# Patient Record
Sex: MALE | Race: WHITE | NOT HISPANIC OR LATINO | Employment: OTHER | URBAN - METROPOLITAN AREA
[De-identification: names, ages, dates, MRNs, and addresses within clinical notes are randomized per-mention and may not be internally consistent; named-entity substitution may affect disease eponyms.]

---

## 2021-01-01 ENCOUNTER — TRANSFERRED RECORDS (OUTPATIENT)
Dept: MULTI SPECIALTY CLINIC | Facility: CLINIC | Age: 66
End: 2021-01-01

## 2021-09-18 ENCOUNTER — HOSPITAL ENCOUNTER (INPATIENT)
Facility: CLINIC | Age: 66
LOS: 3 days | Discharge: HOME | DRG: 039 | End: 2021-09-21
Attending: INTERNAL MEDICINE | Admitting: INTERNAL MEDICINE
Payer: COMMERCIAL

## 2021-09-18 ENCOUNTER — HOSPITAL ENCOUNTER (EMERGENCY)
Facility: CLINIC | Age: 66
Discharge: ANOTHER HOSPITAL | End: 2021-09-18
Attending: EMERGENCY MEDICINE | Admitting: EMERGENCY MEDICINE
Payer: COMMERCIAL

## 2021-09-18 ENCOUNTER — APPOINTMENT (OUTPATIENT)
Dept: CT IMAGING | Facility: CLINIC | Age: 66
End: 2021-09-18
Attending: EMERGENCY MEDICINE
Payer: COMMERCIAL

## 2021-09-18 ENCOUNTER — APPOINTMENT (OUTPATIENT)
Dept: MRI IMAGING | Facility: CLINIC | Age: 66
End: 2021-09-18
Attending: EMERGENCY MEDICINE
Payer: COMMERCIAL

## 2021-09-18 VITALS
BODY MASS INDEX: 25.84 KG/M2 | WEIGHT: 195 LBS | TEMPERATURE: 97.8 F | HEART RATE: 50 BPM | HEIGHT: 73 IN | RESPIRATION RATE: 16 BRPM | OXYGEN SATURATION: 98 % | SYSTOLIC BLOOD PRESSURE: 166 MMHG | DIASTOLIC BLOOD PRESSURE: 80 MMHG

## 2021-09-18 DIAGNOSIS — I65.22 STENOSIS OF LEFT CAROTID ARTERY: ICD-10-CM

## 2021-09-18 DIAGNOSIS — I63.9 ACUTE ISCHEMIC STROKE (H): Primary | ICD-10-CM

## 2021-09-18 LAB
ANION GAP SERPL CALCULATED.3IONS-SCNC: 7 MMOL/L (ref 5–18)
APTT PPP: 26 SECONDS (ref 22–38)
BASOPHILS # BLD AUTO: 0.1 10E3/UL (ref 0–0.2)
BASOPHILS NFR BLD AUTO: 1 %
BUN SERPL-MCNC: 23 MG/DL (ref 8–22)
CALCIUM SERPL-MCNC: 9 MG/DL (ref 8.5–10.5)
CHLORIDE BLD-SCNC: 106 MMOL/L (ref 98–107)
CO2 SERPL-SCNC: 25 MMOL/L (ref 22–31)
CREAT BLD-MCNC: 1.1 MG/DL (ref 0.7–1.3)
CREAT SERPL-MCNC: 1.1 MG/DL (ref 0.7–1.3)
EOSINOPHIL # BLD AUTO: 0.1 10E3/UL (ref 0–0.7)
EOSINOPHIL NFR BLD AUTO: 1 %
ERYTHROCYTE [DISTWIDTH] IN BLOOD BY AUTOMATED COUNT: 11.9 % (ref 10–15)
GFR SERPL CREATININE-BSD FRML MDRD: 69 ML/MIN/1.73M2
GFR SERPL CREATININE-BSD FRML MDRD: >60 ML/MIN/1.73M2
GLUCOSE BLD-MCNC: 116 MG/DL (ref 70–125)
GLUCOSE BLDC GLUCOMTR-MCNC: 121 MG/DL (ref 70–99)
GLUCOSE BLDC GLUCOMTR-MCNC: 143 MG/DL (ref 70–99)
HCT VFR BLD AUTO: 42 % (ref 40–53)
HGB BLD-MCNC: 14.4 G/DL (ref 13.3–17.7)
HOLD SPECIMEN: NORMAL
IMM GRANULOCYTES # BLD: 0 10E3/UL
IMM GRANULOCYTES NFR BLD: 0 %
INR PPP: 0.98 (ref 0.85–1.15)
LYMPHOCYTES # BLD AUTO: 1.9 10E3/UL (ref 0.8–5.3)
LYMPHOCYTES NFR BLD AUTO: 32 %
MCH RBC QN AUTO: 31.2 PG (ref 26.5–33)
MCHC RBC AUTO-ENTMCNC: 34.3 G/DL (ref 31.5–36.5)
MCV RBC AUTO: 91 FL (ref 78–100)
MONOCYTES # BLD AUTO: 0.6 10E3/UL (ref 0–1.3)
MONOCYTES NFR BLD AUTO: 10 %
NEUTROPHILS # BLD AUTO: 3.3 10E3/UL (ref 1.6–8.3)
NEUTROPHILS NFR BLD AUTO: 56 %
NRBC # BLD AUTO: 0 10E3/UL
NRBC BLD AUTO-RTO: 0 /100
PLATELET # BLD AUTO: 246 10E3/UL (ref 150–450)
POTASSIUM BLD-SCNC: 4.4 MMOL/L (ref 3.5–5)
RBC # BLD AUTO: 4.62 10E6/UL (ref 4.4–5.9)
SARS-COV-2 RNA RESP QL NAA+PROBE: NEGATIVE
SODIUM SERPL-SCNC: 138 MMOL/L (ref 136–145)
TROPONIN I SERPL-MCNC: <0.01 NG/ML (ref 0–0.29)
WBC # BLD AUTO: 5.9 10E3/UL (ref 4–11)

## 2021-09-18 PROCEDURE — 70498 CT ANGIOGRAPHY NECK: CPT

## 2021-09-18 PROCEDURE — 85730 THROMBOPLASTIN TIME PARTIAL: CPT | Performed by: EMERGENCY MEDICINE

## 2021-09-18 PROCEDURE — 93005 ELECTROCARDIOGRAM TRACING: CPT | Performed by: EMERGENCY MEDICINE

## 2021-09-18 PROCEDURE — 70551 MRI BRAIN STEM W/O DYE: CPT

## 2021-09-18 PROCEDURE — 250N000013 HC RX MED GY IP 250 OP 250 PS 637: Performed by: EMERGENCY MEDICINE

## 2021-09-18 PROCEDURE — 85048 AUTOMATED LEUKOCYTE COUNT: CPT | Performed by: EMERGENCY MEDICINE

## 2021-09-18 PROCEDURE — 258N000003 HC RX IP 258 OP 636: Performed by: INTERNAL MEDICINE

## 2021-09-18 PROCEDURE — 85610 PROTHROMBIN TIME: CPT | Performed by: EMERGENCY MEDICINE

## 2021-09-18 PROCEDURE — C9803 HOPD COVID-19 SPEC COLLECT: HCPCS

## 2021-09-18 PROCEDURE — 99291 CRITICAL CARE FIRST HOUR: CPT | Mod: 25

## 2021-09-18 PROCEDURE — 84484 ASSAY OF TROPONIN QUANT: CPT | Performed by: EMERGENCY MEDICINE

## 2021-09-18 PROCEDURE — 250N000013 HC RX MED GY IP 250 OP 250 PS 637: Performed by: INTERNAL MEDICINE

## 2021-09-18 PROCEDURE — 80048 BASIC METABOLIC PNL TOTAL CA: CPT | Performed by: EMERGENCY MEDICINE

## 2021-09-18 PROCEDURE — U0003 INFECTIOUS AGENT DETECTION BY NUCLEIC ACID (DNA OR RNA); SEVERE ACUTE RESPIRATORY SYNDROME CORONAVIRUS 2 (SARS-COV-2) (CORONAVIRUS DISEASE [COVID-19]), AMPLIFIED PROBE TECHNIQUE, MAKING USE OF HIGH THROUGHPUT TECHNOLOGIES AS DESCRIBED BY CMS-2020-01-R: HCPCS | Performed by: EMERGENCY MEDICINE

## 2021-09-18 PROCEDURE — 120N000001 HC R&B MED SURG/OB

## 2021-09-18 PROCEDURE — 250N000011 HC RX IP 250 OP 636: Performed by: EMERGENCY MEDICINE

## 2021-09-18 PROCEDURE — 82565 ASSAY OF CREATININE: CPT

## 2021-09-18 PROCEDURE — 36415 COLL VENOUS BLD VENIPUNCTURE: CPT | Performed by: EMERGENCY MEDICINE

## 2021-09-18 RX ORDER — PROCHLORPERAZINE 25 MG
12.5 SUPPOSITORY, RECTAL RECTAL EVERY 12 HOURS PRN
Status: DISCONTINUED | OUTPATIENT
Start: 2021-09-18 | End: 2021-09-19

## 2021-09-18 RX ORDER — ASPIRIN 325 MG
325 TABLET ORAL ONCE
Status: COMPLETED | OUTPATIENT
Start: 2021-09-18 | End: 2021-09-18

## 2021-09-18 RX ORDER — LIDOCAINE 40 MG/G
CREAM TOPICAL
Status: DISCONTINUED | OUTPATIENT
Start: 2021-09-18 | End: 2021-09-20

## 2021-09-18 RX ORDER — ATORVASTATIN CALCIUM 40 MG/1
40 TABLET, FILM COATED ORAL EVERY EVENING
Status: DISCONTINUED | OUTPATIENT
Start: 2021-09-18 | End: 2021-09-21

## 2021-09-18 RX ORDER — ONDANSETRON 4 MG/1
4 TABLET, ORALLY DISINTEGRATING ORAL EVERY 6 HOURS PRN
Status: DISCONTINUED | OUTPATIENT
Start: 2021-09-18 | End: 2021-09-19

## 2021-09-18 RX ORDER — ASPIRIN 81 MG/1
81 TABLET ORAL DAILY
Status: DISCONTINUED | OUTPATIENT
Start: 2021-09-19 | End: 2021-09-21 | Stop reason: HOSPADM

## 2021-09-18 RX ORDER — CLOPIDOGREL BISULFATE 75 MG/1
300 TABLET ORAL ONCE
Status: COMPLETED | OUTPATIENT
Start: 2021-09-18 | End: 2021-09-18

## 2021-09-18 RX ORDER — IOPAMIDOL 755 MG/ML
100 INJECTION, SOLUTION INTRAVASCULAR ONCE
Status: COMPLETED | OUTPATIENT
Start: 2021-09-18 | End: 2021-09-18

## 2021-09-18 RX ORDER — ASPIRIN 81 MG/1
81 TABLET, CHEWABLE ORAL DAILY
Status: DISCONTINUED | OUTPATIENT
Start: 2021-09-19 | End: 2021-09-21 | Stop reason: HOSPADM

## 2021-09-18 RX ORDER — LABETALOL HYDROCHLORIDE 5 MG/ML
10-20 INJECTION, SOLUTION INTRAVENOUS EVERY 10 MIN PRN
Status: DISCONTINUED | OUTPATIENT
Start: 2021-09-18 | End: 2021-09-20

## 2021-09-18 RX ORDER — PROCHLORPERAZINE MALEATE 5 MG
5 TABLET ORAL EVERY 6 HOURS PRN
Status: DISCONTINUED | OUTPATIENT
Start: 2021-09-18 | End: 2021-09-19

## 2021-09-18 RX ORDER — HYDRALAZINE HYDROCHLORIDE 20 MG/ML
10-20 INJECTION INTRAMUSCULAR; INTRAVENOUS
Status: DISCONTINUED | OUTPATIENT
Start: 2021-09-18 | End: 2021-09-20

## 2021-09-18 RX ORDER — SODIUM CHLORIDE 9 MG/ML
INJECTION, SOLUTION INTRAVENOUS CONTINUOUS
Status: ACTIVE | OUTPATIENT
Start: 2021-09-18 | End: 2021-09-19

## 2021-09-18 RX ORDER — ONDANSETRON 2 MG/ML
4 INJECTION INTRAMUSCULAR; INTRAVENOUS EVERY 6 HOURS PRN
Status: DISCONTINUED | OUTPATIENT
Start: 2021-09-18 | End: 2021-09-19

## 2021-09-18 RX ORDER — IBUPROFEN 200 MG
200 TABLET ORAL EVERY 4 HOURS PRN
Status: ON HOLD | COMMUNITY
End: 2021-09-21

## 2021-09-18 RX ADMIN — ATORVASTATIN CALCIUM 40 MG: 40 TABLET, FILM COATED ORAL at 21:20

## 2021-09-18 RX ADMIN — CLOPIDOGREL BISULFATE 300 MG: 75 TABLET, FILM COATED ORAL at 14:59

## 2021-09-18 RX ADMIN — SODIUM CHLORIDE: 9 INJECTION, SOLUTION INTRAVENOUS at 21:22

## 2021-09-18 RX ADMIN — IOPAMIDOL 100 ML: 755 INJECTION, SOLUTION INTRAVENOUS at 12:41

## 2021-09-18 RX ADMIN — ASPIRIN 325 MG ORAL TABLET 325 MG: 325 PILL ORAL at 13:22

## 2021-09-18 ASSESSMENT — ENCOUNTER SYMPTOMS
DIFFICULTY URINATING: 0
COLOR CHANGE: 0
SHORTNESS OF BREATH: 0
CONFUSION: 0
HEADACHES: 0
ARTHRALGIAS: 0
EYE REDNESS: 0
FEVER: 0
SPEECH DIFFICULTY: 1
ABDOMINAL PAIN: 0
NECK STIFFNESS: 0

## 2021-09-18 ASSESSMENT — MIFFLIN-ST. JEOR: SCORE: 1713.39

## 2021-09-18 NOTE — PHARMACY-ADMISSION MEDICATION HISTORY
Pharmacy Note - Admission Medication History    Pertinent Provider Information: Pt stated he should start to be better about taking aspirin regularly     ______________________________________________________________________    Prior To Admission (PTA) med list completed and updated in EMR.       PTA Med List   Medication Sig Last Dose     Ascorbic Acid 500 MG CHEW Take 500 mg by mouth daily  9/13/2021     aspirin (ASA) 81 MG EC tablet Take 81 mg by mouth daily  Past Month at Unknown time     ibuprofen (ADVIL/MOTRIN) 200 MG tablet Take 200 mg by mouth every 4 hours as needed for mild pain 9/17/2021       Information source(s): Patient, Family member and CareEverywhere/SureScripts  Method of interview communication: in-person    Summary of Changes to PTA Med List  New: ibuprofen  Discontinued: black elderberry  Changed: none    Patient was asked about OTC/herbal products specifically.  PTA med list reflects this.    In the past week, patient estimated taking medication this percent of the time:  less than 50% due to other.    Allergies were reviewed, assessed, and updated with the patient.      Patient does not use any multi-dose medications prior to admission.    The information provided in this note is only as accurate as the sources available at the time of the update(s).    Thank you for the opportunity to participate in the care of this patient.    Shayna Johnson  9/18/2021 2:48 PM

## 2021-09-18 NOTE — ED PROVIDER NOTES
This is a patient that was seen by Dr. Whitley.  His work-up is done and he has had a TIA and has ICA stenosis.  Plan is to admit him to a neuro bed when we can find 1.  Bed was found at [facility] and I spoke to Dr. Stanley who accepted the patient.  I have updated the patient and he is symptom free and feeling well and in agreement     Eladio Garza MD  09/18/21 1524

## 2021-09-18 NOTE — ED PROVIDER NOTES
EMERGENCY DEPARTMENT ENCOUNTER     NAME: Bernabe Grullon   AGE: 66 year old male   YOB: 1955   MRN: [id number]   EVALUATION DATE & TIME: 9/18/2021 12:08 PM   PCP: No primary care provider on file.     Chief Complaint   Patient presents with     Aphasia   :    FINAL IMPRESSION       1. Stenosis of left carotid artery           ED COURSE & MEDICAL DECISION MAKING      12:08 PM I met with the patient, obtained history, performed an initial exam, and discussed options and plan for diagnostics and treatment here in the ED.   12:11 PM Spoke with neurologist, Nusrat Riggs, who recommends against TPA based on his NIH scale.   12:24 PM Spoke with radiologist, Dr. Sargent.  12:52 PM Spoke with radiologist, Dr. Sargent, concerning results from CT scan.   1:14 PM Spoke with neurologist, Nusrat Riggs. Updated patient regarding plan moving forward.     Pertinent Labs & Imaging studies reviewed. (See chart for details)   66 year old male  presents to the Emergency Department for evaluation of slurred speech and dysarthria that started 30 minutes prior to arrival. Initial Vitals Reviewed. Initial exam notable for generally well-appearing male who has no extremity symptoms or facial droop, but he does have some dysarthria and difficulty with word finding.  NIH stroke scale of 1.  Per his wife, he does have a history of known carotid gnosis on the left.  Stroke code was initiated and patient was taken for CT and CTA.  Fortunately the plain head CT does not show any bleeding but the CTA shows what radiology is telling me is a 95% narrowing of his left internal carotid which is likely what is making him symptomatic.  I initially discussed his case with neurology and given the low NIH stroke scale they recommended against TPA.  Ultimately I have an MRI ordered and I will discuss the case with neurology again for any further recommendations, but I do think that he will require admission to the  "hospital and neurology consultation.  Therefore, he will need to transfer for care.    Update: I have discussed the case again with neurologist.  It is likely that the significant carotid stenosis is what is causing the symptoms, and it is either causing TIA or CVA that did not show up on the CTA.  There is an MRI ordered to help delineate.  He will need neurology consultation and likely intervention of this carotid with a CEA, so temporarily I am giving him a Plavix load and full dose aspirin per neurology recommendations.  He will be held here in our emergency department until neuro telemetry bed becomes available at another facility with neurology capabilities.           At the conclusion of the encounter I discussed the results of all of the tests and the disposition. The questions were answered. The patient or family acknowledged understanding and was agreeable with the care plan.     60 minutes critical care time, see procedure note below for details if relevant    MEDICATIONS GIVEN IN THE EMERGENCY:   Medications - No data to display   NEW PRESCRIPTIONS STARTED AT TODAY'S ER VISIT   New Prescriptions    No medications on file     ================================================================   HISTORY OF PRESENT ILLNESS       Patient information was obtained from: Patient   Use of Intrepreter: N/A    Bernabe Grullon is a 66 year old male with no pertinent medical history who presents via walk in for evaluation of slurred speech.     Patient reports sudden onset of slurred speech at 11:30AM today. He was reportedly making lunch when symptoms began. Wife of patient reports he was unable to say the word \"picture\" and other words. He states he felt healthy this morning before symptoms. Of note, patient does have a blockage on one side, for which he saw a specialist and they do yearly checks on him. Patient denies blood thinners, diabetes, hypertension, or history of brain bleeds. No other complaints at this " time.     ================================================================    REVIEW OF SYSTEMS       Review of Systems   Constitutional: Negative for fever.   HENT: Negative for congestion.    Eyes: Negative for redness.   Respiratory: Negative for shortness of breath.    Cardiovascular: Negative for chest pain.   Gastrointestinal: Negative for abdominal pain.   Genitourinary: Negative for difficulty urinating.   Musculoskeletal: Negative for arthralgias and neck stiffness.   Skin: Negative for color change.   Neurological: Positive for speech difficulty (slurred). Negative for headaches.   Psychiatric/Behavioral: Negative for confusion.   All other systems reviewed and are negative.      PAST HISTORY     PAST MEDICAL HISTORY:   No past medical history on file.   Carotid stenosis   PAST SURGICAL HISTORY:   No past surgical history on file.   CURRENT MEDICATIONS:   No current outpatient medications on file.    ALLERGIES:   Not on File   FAMILY HISTORY:   No family history on file.   SOCIAL HISTORY:   Social History     Socioeconomic History     Marital status: Not on file     Spouse name: Not on file     Number of children: Not on file     Years of education: Not on file     Highest education level: Not on file   Occupational History     Not on file   Tobacco Use     Smoking status: Not on file   Substance and Sexual Activity     Alcohol use: Not on file     Drug use: Not on file     Sexual activity: Not on file   Other Topics Concern     Not on file   Social History Narrative     Not on file     Social Determinants of Health     Financial Resource Strain:      Difficulty of Paying Living Expenses:    Food Insecurity:      Worried About Running Out of Food in the Last Year:      Ran Out of Food in the Last Year:    Transportation Needs:      Lack of Transportation (Medical):      Lack of Transportation (Non-Medical):    Physical Activity:      Days of Exercise per Week:      Minutes of Exercise per Session:   "  Stress:      Feeling of Stress :    Social Connections:      Frequency of Communication with Friends and Family:      Frequency of Social Gatherings with Friends and Family:      Attends Restorationism Services:      Active Member of Clubs or Organizations:      Attends Club or Organization Meetings:      Marital Status:    Intimate Partner Violence:      Fear of Current or Ex-Partner:      Emotionally Abused:      Physically Abused:      Sexually Abused:         VITALS  No data found.     ================================================================    PHYSICAL EXAM     VITAL SIGNS: BP (!) 162/102   Pulse 53   Temp 97.8  F (36.6  C) (Oral)   Resp 16   Ht 1.854 m (6' 1\")   Wt 88.5 kg (195 lb)   SpO2 97%   BMI 25.73 kg/m     Constitutional:  Awake, no acute distress   HENT:  Atraumatic, oropharynx without exudate or erythema, membranes moist  Lymph:  No adenopathy  Eyes: EOM intact, PERRL, no injection  Neck: Supple  Respiratory:  Clear to auscultation bilaterally, no wheezes or crackles   Cardiovascular:  Regular rate and rhythm, single S1 and S2   GI:  Soft, nontender, nondistended, no rebound or guarding   Musculoskeletal:  Moves all extremities, no lower extremity edema, no deformities    Skin:  Warm, dry  Neurologic:  Alert and oriented x3, no focal deficits noted     National Institutes of Health Stroke Scale  Exam Interval: Baseline   Score    Level of consciousness: (0)   Alert, keenly responsive    LOC questions: (0)   Answers both questions correctly    LOC commands: (0)   Performs both tasks correctly    Best gaze: (0)   Normal    Visual: (0)   No visual loss    Facial palsy: (0)   Normal symmetrical movements    Motor arm (left): (0)   No drift    Motor arm (right): (0)   No drift    Motor leg (left): (0)   No drift    Motor leg (right): (0)   No drift    Limb ataxia: (0)   Absent    Sensory: (0)   Normal- no sensory loss    Best language: (0)   Normal- no aphasia    Dysarthria: (1)   Mild to " moderate dysarthria    Extinction and inattention: (0)   No abnormality        Total Score:  1         ================================================================  LAB       All pertinent labs reviewed and interpreted.   Labs Ordered and Resulted from Time of ED Arrival Up to the Time of Departure from the ED   BASIC METABOLIC PANEL - Abnormal; Notable for the following components:       Result Value    Urea Nitrogen 23 (*)     All other components within normal limits   GLUCOSE BY METER - Abnormal; Notable for the following components:    GLUCOSE BY METER POCT 121 (*)     All other components within normal limits   INR - Normal   PARTIAL THROMBOPLASTIN TIME - Normal   TROPONIN I - Normal   ISTAT CREATININE POCT - Normal   GLUCOSE MONITOR NURSING POCT   CBC WITH PLATELETS AND DIFFERENTIAL   EXTRA RED TOP TUBE   INITIATE   NOTIFY   MEASURE WEIGHT   VITAL SIGNS   NEURO CHECKS   CARDIAC CONTINUOUS MONITORING   PULSE OXIMETRY NURSING   ACTIVITY   HEAD OF BED   PERIPHERAL IV CATHETER   IV ACCESS   IP DYSPHAGIA SCREEN   CALL   CBC WITH PLATELETS & DIFFERENTIAL    Narrative:     The following orders were created for panel order CBC with Platelets & Differential.  Procedure                               Abnormality         Status                     ---------                               -----------         ------                     CBC with platelets and d...[[id number]]                      Final result                 Please view results for these tests on the individual orders.   EXTRA TUBE    Narrative:     The following orders were created for panel order Extra Tube.  Procedure                               Abnormality         Status                     ---------                               -----------         ------                     Extra Red Top Tube[[id number]]                               Final result                 Please view results for these tests on the individual orders.         ===============================================================  RADIOLOGY       Reviewed all pertinent imaging. Please see official radiology report.   CTA Head Neck with Contrast   Final Result   IMPRESSION:    HEAD CT:   1.  No CT evidence for acute intracranial process.   2.  Brain atrophy and presumed chronic microvascular ischemic changes as above.      HEAD CTA:    1.  No significant stenosis, aneurysm, or high flow vascular malformation identified.   2.  Variant [place] of Valerio anatomy as above.      NECK CTA:   1.  There is luminal low-attenuation within the proximal left internal carotid artery just distal to its origin favored reflect mural and/or intravascular thrombus within severe high-grade narrowing (greater than 95%) or less likely segmental occlusion.    There is markedly diminished caliber of the left cervical ICA distal to this with improved vascular caliber of the cavernous and supraclinoid left ICA.      Noncontrast imaging findings were discussed Dr. Whitley of the clinical service at 12:23 PM. CTA imaging findings were discussed with Dr. Whitley of the clinical service at 12:50 PM on 09/18/2021.         MR Brain w/o Contrast    (Results Pending)         ================================================================  EKG     EKG reviewed interpreted by me shows sinus bradycardia with rate of 57, normal axis,  with no acute ST or T wave changes    I have independently reviewed and interpreted the EKG(s) documented above.     ================================================================  PROCEDURES       Critical Care  Performed by: Viridiana Whitley MD  Authorized by: Viridiana Whitley MD  Total critical care time: 60 minutes  Critical care time was exclusive of separately billable procedures and treating other patients.  Critical care was necessary to treat or prevent imminent or life-threatening deterioration of the following conditions: Stroke code  Critical care was time spent  personally by me on the following activities: development of treatment plan with patient or surrogate, discussions with consultants, examination of patient, evaluation of patient's response to treatment, obtaining history from patient or surrogate, ordering and performing treatments and interventions, ordering and review of laboratory studies, ordering and review of radiographic studies and re-evaluation of patient's condition, this excludes any separately billable procedures.        I, danie, am serving as a scribe to document services personally performed by Dr. Whitley based on my observation and the provider's statements to me. I, Viridiana Whitley MD attest that danie is acting in a scribe capacity, has observed my performance of the services and has documented them in accordance with my direction.   Viridiana Whitley M.D.   Emergency Medicine   [facility] EMERGENCY ROOM  [address] [place] [id number]  [phone]  Dept: [phone]          Viridiana Whitley MD  09/18/21 1555

## 2021-09-18 NOTE — ED TRIAGE NOTES
"Arrived by private car b/b spouse  Pt reports at about 1130 today noted slight headache, slurred speech and \"not able to get words out\"   Called provider to triage to evaluate, Tier 1 stroke code called  "

## 2021-09-19 ENCOUNTER — APPOINTMENT (OUTPATIENT)
Dept: OCCUPATIONAL THERAPY | Facility: CLINIC | Age: 66
DRG: 039 | End: 2021-09-19
Attending: INTERNAL MEDICINE
Payer: COMMERCIAL

## 2021-09-19 ENCOUNTER — APPOINTMENT (OUTPATIENT)
Dept: INTERVENTIONAL RADIOLOGY/VASCULAR | Facility: CLINIC | Age: 66
DRG: 039 | End: 2021-09-19
Attending: SURGERY
Payer: COMMERCIAL

## 2021-09-19 LAB
ANION GAP SERPL CALCULATED.3IONS-SCNC: 9 MMOL/L (ref 3–14)
ATRIAL RATE - MUSE: 57 BPM
BUN SERPL-MCNC: 15 MG/DL (ref 7–30)
CALCIUM SERPL-MCNC: 8.9 MG/DL (ref 8.5–10.1)
CHLORIDE BLD-SCNC: 109 MMOL/L (ref 94–109)
CHOLEST SERPL-MCNC: 231 MG/DL
CO2 SERPL-SCNC: 23 MMOL/L (ref 20–32)
CREAT SERPL-MCNC: 0.94 MG/DL (ref 0.66–1.25)
DIASTOLIC BLOOD PRESSURE - MUSE: NORMAL MMHG
ERYTHROCYTE [DISTWIDTH] IN BLOOD BY AUTOMATED COUNT: 11.9 % (ref 10–15)
GFR SERPL CREATININE-BSD FRML MDRD: 84 ML/MIN/1.73M2
GLUCOSE BLD-MCNC: 117 MG/DL (ref 70–99)
GLUCOSE BLDC GLUCOMTR-MCNC: 102 MG/DL (ref 70–99)
GLUCOSE BLDC GLUCOMTR-MCNC: 105 MG/DL (ref 70–99)
GLUCOSE BLDC GLUCOMTR-MCNC: 107 MG/DL (ref 70–99)
GLUCOSE BLDC GLUCOMTR-MCNC: 124 MG/DL (ref 70–99)
GLUCOSE BLDC GLUCOMTR-MCNC: 98 MG/DL (ref 70–99)
HBA1C MFR BLD: 5.3 % (ref 0–5.6)
HCT VFR BLD AUTO: 45.1 % (ref 40–53)
HDLC SERPL-MCNC: 54 MG/DL
HGB BLD-MCNC: 15.7 G/DL (ref 13.3–17.7)
INTERPRETATION ECG - MUSE: NORMAL
LDLC SERPL CALC-MCNC: 139 MG/DL
MCH RBC QN AUTO: 31.1 PG (ref 26.5–33)
MCHC RBC AUTO-ENTMCNC: 34.8 G/DL (ref 31.5–36.5)
MCV RBC AUTO: 89 FL (ref 78–100)
NONHDLC SERPL-MCNC: 177 MG/DL
P AXIS - MUSE: 81 DEGREES
PLATELET # BLD AUTO: 266 10E3/UL (ref 150–450)
POTASSIUM BLD-SCNC: 3.8 MMOL/L (ref 3.4–5.3)
PR INTERVAL - MUSE: 218 MS
QRS DURATION - MUSE: 60 MS
QT - MUSE: 416 MS
QTC - MUSE: 404 MS
R AXIS - MUSE: 14 DEGREES
RBC # BLD AUTO: 5.05 10E6/UL (ref 4.4–5.9)
SODIUM SERPL-SCNC: 141 MMOL/L (ref 133–144)
SYSTOLIC BLOOD PRESSURE - MUSE: NORMAL MMHG
T AXIS - MUSE: 41 DEGREES
TRIGL SERPL-MCNC: 188 MG/DL
VENTRICULAR RATE- MUSE: 57 BPM
WBC # BLD AUTO: 6.9 10E3/UL (ref 4–11)

## 2021-09-19 PROCEDURE — 36226 PLACE CATH VERTEBRAL ART: CPT | Mod: LT | Performed by: RADIOLOGY

## 2021-09-19 PROCEDURE — 99152 MOD SED SAME PHYS/QHP 5/>YRS: CPT

## 2021-09-19 PROCEDURE — 99222 1ST HOSP IP/OBS MODERATE 55: CPT | Mod: 57 | Performed by: SURGERY

## 2021-09-19 PROCEDURE — 250N000013 HC RX MED GY IP 250 OP 250 PS 637: Performed by: PHYSICIAN ASSISTANT

## 2021-09-19 PROCEDURE — 80061 LIPID PANEL: CPT | Performed by: INTERNAL MEDICINE

## 2021-09-19 PROCEDURE — 250N000011 HC RX IP 250 OP 636: Performed by: SURGERY

## 2021-09-19 PROCEDURE — 85027 COMPLETE CBC AUTOMATED: CPT | Performed by: INTERNAL MEDICINE

## 2021-09-19 PROCEDURE — 120N000001 HC R&B MED SURG/OB

## 2021-09-19 PROCEDURE — C1760 CLOSURE DEV, VASC: HCPCS

## 2021-09-19 PROCEDURE — 83036 HEMOGLOBIN GLYCOSYLATED A1C: CPT | Performed by: INTERNAL MEDICINE

## 2021-09-19 PROCEDURE — C1769 GUIDE WIRE: HCPCS

## 2021-09-19 PROCEDURE — 80048 BASIC METABOLIC PNL TOTAL CA: CPT | Performed by: INTERNAL MEDICINE

## 2021-09-19 PROCEDURE — B3171ZZ FLUOROSCOPY OF LEFT INTERNAL CAROTID ARTERY USING LOW OSMOLAR CONTRAST: ICD-10-PCS | Performed by: RADIOLOGY

## 2021-09-19 PROCEDURE — C1887 CATHETER, GUIDING: HCPCS

## 2021-09-19 PROCEDURE — 250N000013 HC RX MED GY IP 250 OP 250 PS 637: Performed by: INTERNAL MEDICINE

## 2021-09-19 PROCEDURE — 250N000009 HC RX 250

## 2021-09-19 PROCEDURE — 250N000011 HC RX IP 250 OP 636

## 2021-09-19 PROCEDURE — 99152 MOD SED SAME PHYS/QHP 5/>YRS: CPT | Mod: GC | Performed by: RADIOLOGY

## 2021-09-19 PROCEDURE — 36415 COLL VENOUS BLD VENIPUNCTURE: CPT | Performed by: INTERNAL MEDICINE

## 2021-09-19 PROCEDURE — 76937 US GUIDE VASCULAR ACCESS: CPT | Mod: 26 | Performed by: RADIOLOGY

## 2021-09-19 PROCEDURE — 99232 SBSQ HOSP IP/OBS MODERATE 35: CPT | Performed by: INTERNAL MEDICINE

## 2021-09-19 PROCEDURE — 258N000003 HC RX IP 258 OP 636: Performed by: INTERNAL MEDICINE

## 2021-09-19 PROCEDURE — 250N000013 HC RX MED GY IP 250 OP 250 PS 637: Performed by: NURSE PRACTITIONER

## 2021-09-19 PROCEDURE — 272N000567 HC SHEATH CR4

## 2021-09-19 PROCEDURE — 97165 OT EVAL LOW COMPLEX 30 MIN: CPT | Mod: GO

## 2021-09-19 PROCEDURE — 99222 1ST HOSP IP/OBS MODERATE 55: CPT | Mod: GC | Performed by: PSYCHIATRY & NEUROLOGY

## 2021-09-19 PROCEDURE — 255N000002 HC RX 255 OP 636: Performed by: RADIOLOGY

## 2021-09-19 RX ORDER — ONDANSETRON 2 MG/ML
4 INJECTION INTRAMUSCULAR; INTRAVENOUS EVERY 6 HOURS PRN
Status: DISCONTINUED | OUTPATIENT
Start: 2021-09-19 | End: 2021-09-20

## 2021-09-19 RX ORDER — SODIUM CHLORIDE 9 MG/ML
INJECTION, SOLUTION INTRAVENOUS CONTINUOUS
Status: DISCONTINUED | OUTPATIENT
Start: 2021-09-19 | End: 2021-09-19

## 2021-09-19 RX ORDER — SODIUM CHLORIDE 9 MG/ML
INJECTION, SOLUTION INTRAVENOUS CONTINUOUS
Status: ACTIVE | OUTPATIENT
Start: 2021-09-19 | End: 2021-09-20

## 2021-09-19 RX ORDER — NALOXONE HYDROCHLORIDE 0.4 MG/ML
0.2 INJECTION, SOLUTION INTRAMUSCULAR; INTRAVENOUS; SUBCUTANEOUS
Status: DISCONTINUED | OUTPATIENT
Start: 2021-09-19 | End: 2021-09-19 | Stop reason: HOSPADM

## 2021-09-19 RX ORDER — ACETAMINOPHEN 650 MG/1
650 SUPPOSITORY RECTAL EVERY 4 HOURS PRN
Status: DISCONTINUED | OUTPATIENT
Start: 2021-09-19 | End: 2021-09-21 | Stop reason: HOSPADM

## 2021-09-19 RX ORDER — LIDOCAINE 40 MG/G
CREAM TOPICAL
Status: CANCELLED | OUTPATIENT
Start: 2021-09-19

## 2021-09-19 RX ORDER — SODIUM CHLORIDE 9 MG/ML
INJECTION, SOLUTION INTRAVENOUS CONTINUOUS
Status: CANCELLED | OUTPATIENT
Start: 2021-09-19

## 2021-09-19 RX ORDER — PROCHLORPERAZINE MALEATE 5 MG
5 TABLET ORAL EVERY 6 HOURS PRN
Status: DISCONTINUED | OUTPATIENT
Start: 2021-09-19 | End: 2021-09-21 | Stop reason: HOSPADM

## 2021-09-19 RX ORDER — FENTANYL CITRATE 50 UG/ML
25-50 INJECTION, SOLUTION INTRAMUSCULAR; INTRAVENOUS EVERY 5 MIN PRN
Status: DISCONTINUED | OUTPATIENT
Start: 2021-09-19 | End: 2021-09-19 | Stop reason: HOSPADM

## 2021-09-19 RX ORDER — NALOXONE HYDROCHLORIDE 0.4 MG/ML
0.4 INJECTION, SOLUTION INTRAMUSCULAR; INTRAVENOUS; SUBCUTANEOUS
Status: DISCONTINUED | OUTPATIENT
Start: 2021-09-19 | End: 2021-09-19 | Stop reason: HOSPADM

## 2021-09-19 RX ORDER — ACETAMINOPHEN 325 MG/1
650 TABLET ORAL EVERY 4 HOURS PRN
Status: DISCONTINUED | OUTPATIENT
Start: 2021-09-19 | End: 2021-09-21 | Stop reason: HOSPADM

## 2021-09-19 RX ORDER — PROCHLORPERAZINE 25 MG
12.5 SUPPOSITORY, RECTAL RECTAL EVERY 12 HOURS PRN
Status: DISCONTINUED | OUTPATIENT
Start: 2021-09-19 | End: 2021-09-21 | Stop reason: HOSPADM

## 2021-09-19 RX ORDER — IOPAMIDOL 612 MG/ML
100 INJECTION, SOLUTION INTRAVASCULAR ONCE
Status: COMPLETED | OUTPATIENT
Start: 2021-09-19 | End: 2021-09-19

## 2021-09-19 RX ORDER — HEPARIN SODIUM 200 [USP'U]/100ML
1 INJECTION, SOLUTION INTRAVENOUS CONTINUOUS PRN
Status: DISCONTINUED | OUTPATIENT
Start: 2021-09-19 | End: 2021-09-19 | Stop reason: HOSPADM

## 2021-09-19 RX ORDER — CLOPIDOGREL BISULFATE 75 MG/1
75 TABLET ORAL DAILY
Status: DISCONTINUED | OUTPATIENT
Start: 2021-09-19 | End: 2021-09-21 | Stop reason: HOSPADM

## 2021-09-19 RX ORDER — FLUMAZENIL 0.1 MG/ML
0.2 INJECTION, SOLUTION INTRAVENOUS
Status: DISCONTINUED | OUTPATIENT
Start: 2021-09-19 | End: 2021-09-19 | Stop reason: HOSPADM

## 2021-09-19 RX ORDER — ONDANSETRON 4 MG/1
4 TABLET, ORALLY DISINTEGRATING ORAL EVERY 6 HOURS PRN
Status: DISCONTINUED | OUTPATIENT
Start: 2021-09-19 | End: 2021-09-20

## 2021-09-19 RX ADMIN — MIDAZOLAM HYDROCHLORIDE 1 MG: 1 INJECTION, SOLUTION INTRAMUSCULAR; INTRAVENOUS at 15:24

## 2021-09-19 RX ADMIN — ASPIRIN 81 MG CHEWABLE TABLET 81 MG: 81 TABLET CHEWABLE at 08:31

## 2021-09-19 RX ADMIN — SODIUM CHLORIDE: 9 INJECTION, SOLUTION INTRAVENOUS at 16:35

## 2021-09-19 RX ADMIN — ONDANSETRON 4 MG: 2 INJECTION INTRAMUSCULAR; INTRAVENOUS at 16:32

## 2021-09-19 RX ADMIN — LIDOCAINE HYDROCHLORIDE 7 ML: 10 INJECTION, SOLUTION INFILTRATION; PERINEURAL at 15:26

## 2021-09-19 RX ADMIN — FENTANYL CITRATE 50 MCG: 50 INJECTION, SOLUTION INTRAMUSCULAR; INTRAVENOUS at 15:24

## 2021-09-19 RX ADMIN — CLOPIDOGREL BISULFATE 75 MG: 75 TABLET ORAL at 13:13

## 2021-09-19 RX ADMIN — IOPAMIDOL 18 ML: 612 INJECTION, SOLUTION INTRAVENOUS at 15:38

## 2021-09-19 RX ADMIN — HEPARIN SODIUM 3 BAG: 200 INJECTION, SOLUTION INTRAVENOUS at 15:29

## 2021-09-19 RX ADMIN — Medication 1 MG: at 22:04

## 2021-09-19 RX ADMIN — ATORVASTATIN CALCIUM 40 MG: 40 TABLET, FILM COATED ORAL at 19:23

## 2021-09-19 RX ADMIN — ACETAMINOPHEN 650 MG: 325 TABLET, FILM COATED ORAL at 22:04

## 2021-09-19 RX ADMIN — ENOXAPARIN SODIUM 40 MG: 40 INJECTION SUBCUTANEOUS at 19:23

## 2021-09-19 RX ADMIN — ACETAMINOPHEN 650 MG: 325 TABLET, FILM COATED ORAL at 17:43

## 2021-09-19 ASSESSMENT — ACTIVITIES OF DAILY LIVING (ADL)
ADLS_ACUITY_SCORE: 4

## 2021-09-19 NOTE — CONSULTS
"      [facility]    Stroke Consult Note    Reason for Consult:  \"stroke\"    Chief Complaint: No chief complaint on file.       HPI  Bernabe Grullon is a 66 year old male with past medical history significant for known L ICA stenosis who presented to the [facility] ED 9/18 for evaluation of dysarthria and word finding difficulty that began around noon yesterday. NIHSS documented as 1 at the OSH. CTH was negative for acute pathology. CTA head/neck demonstrates >95% L ICA stenosis. A follow-up MRI shows a recent L parietal infarct. He was transferred to [facility] for further management and vascular surgery evaluation.     Today on exam he reports feeling back to baseline. He shares that he had vascular imaging in April that showed ~50% L ICA narrowing.     Stroke Evaluation Summarized    MRI/Head CT MRI ann marie with acute to early subacute left parietal infarcts   Intracranial Vasculature CTA head unremarkable   Cervical Vasculature CTA neck with >95% narrowing with possible segmental occlusion of the L ICA     Echocardiogram Pending**   EKG/Telemetry Sinus mai with 1st degree AVB   Other Testing Not Applicable     LDL  9/19/2021: 139 mg/dL   A1C  9/19/2021: 5.3 %   Troponin No lab value available in past 48 hrs       Impression  Acute ischemic stroke of left parietal lobe due to large-artery atherosclerosis - symptomatic L ICA stenosis vs possible segmental occlusion    Recommendations  - Recommend DAPT with ASA 81 mg + Plavix 75 mg (Loaded with Plavix 9/18). Per vascular note, continue on ASA/Plavix  - , recommend increasing Lipitor to 80 mg daily with goal LDL 40-70  - Permissive HTN today, strict BP control post-CEA.   - Per vascular, plan for DSA to ensure that L ICA is not entirely occluded. Tentative CEA Tuesday  - Awaiting TTE  - Therapies as needed    Patient Follow-up    - final recommendation pending work-up    Thank you for this consult. We will continue to follow.     Syl Thomas, " "APRN, CNP  Neurology  To page me or covering stroke neurology team member, click here: AMCOM   Choose \"On Call\" tab at top, then search dropdown box for \"Neurology Adult\", select location, press Enter, then look for stroke/neuro ICU/telestroke.    _____________________________________________________    Clinically Significant Risk Factors Present on Admission             # Platelet Defect: home medication list includes an antiplatelet medication       Past Medical History   Past Medical History:   Diagnosis Date     Carotid stenosis      Past Surgical History   History reviewed. No pertinent surgical history.  Medications   Home Meds  Prior to Admission medications    Medication Sig Start Date End Date Taking? Authorizing Provider   Ascorbic Acid 500 MG CHEW Take 500 mg by mouth daily    Yes Reported, Patient   aspirin (ASA) 81 MG EC tablet Take 81 mg by mouth daily  3/2/21  Yes Reported, Patient   ibuprofen (ADVIL/MOTRIN) 200 MG tablet Take 200 mg by mouth every 4 hours as needed for mild pain   Yes Unknown, Entered By History       Scheduled Meds    aspirin  81 mg Oral Daily    Or     aspirin  81 mg Oral or NG Tube Daily     atorvastatin  40 mg Oral or Feeding Tube QPM     sodium chloride (PF)  3 mL Intracatheter Q8H       Infusion Meds    - MEDICATION INSTRUCTIONS -       - MEDICATION INSTRUCTIONS -       sodium chloride         PRN Meds  labetalol **OR** hydrALAZINE, lidocaine 4%, lidocaine (buffered or not buffered), - MEDICATION INSTRUCTIONS -, - MEDICATION INSTRUCTIONS -, ondansetron **OR** ondansetron, prochlorperazine **OR** prochlorperazine **OR** prochlorperazine, sodium chloride (PF)    Allergies   Allergies   Allergen Reactions     Penicillin G Other (See Comments)     Family History   Family History   Problem Relation Age of Onset     Cerebrovascular Disease Mother      Social History   Social History     Tobacco Use     Smoking status: Never Smoker     Smokeless tobacco: Never Used   Substance Use " Topics     Alcohol use: Yes     Comment: 2 drinks daiy      Drug use: Never       Review of Systems   The 10 point Review of Systems is negative other than noted in the HPI or here.        PHYSICAL EXAMINATION   Temp:  [97.6  F (36.4  C)-98.2  F (36.8  C)] 98.1  F (36.7  C)  Pulse:  [46-56] 56  Resp:  [13-25] 18  BP: (142-176)/(75-96) 142/91  SpO2:  [96 %-100 %] 96 %    Neurologic  Mental Status:  alert, oriented x 3, follows commands, speech clear and fluent, naming and repetition normal  Cranial Nerves:  visual fields intact, PERRL, EOMI with normal smooth pursuit, facial sensation intact and symmetric, facial movements symmetric, hearing not formally tested but intact to conversation, palate elevation symmetric and uvula midline, no dysarthria, shoulder shrug strong bilaterally, tongue protrusion midline  Motor:  normal muscle tone and bulk, no abnormal movements, able to move all limbs spontaneously, strength 5/5 throughout upper and lower extremities, no pronator drift  Reflexes:  deferred   Sensory:  light touch sensation intact and symmetric throughout upper and lower extremities, no extinction on double simultaneous stimulation   Coordination:  normal finger-to-nose and heel-to-shin bilaterally without dysmetria  Station/Gait:  deferred    Dysphagia Screen  Per Nursing    Stroke Scales    NIHSS  Interval transfer (to [facility]) (09/18/21 2054)   Interval Comments     1a. Level of Consciousness 0-->Alert, keenly responsive   1b. LOC Questions 0-->Answers both questions correctly   1c. LOC Commands 0-->Performs both tasks correctly   2.   Best Gaze 0-->Normal   3.   Visual 0-->No visual loss   4.   Facial Palsy 0-->Normal symmetrical movements   5a. Motor Arm, Left 0-->No drift, limb holds 90 (or 45) degrees for full 10 secs   5b. Motor Arm, Right 0-->No drift, limb holds 90 (or 45) degrees for full 10 secs   6a. Motor Leg, Left 0-->No drift, leg holds 30 degree position for full 5 secs   6b. Motor Leg, right  0-->No drift, leg holds 30 degree position for full 5 secs   7.   Limb Ataxia 0-->Absent   8.   Sensory 0-->Normal, no sensory loss   9.   Best Language 0-->No aphasia, normal   10. Dysarthria 0-->Normal   11. Extinction and Inattention  0-->No abnormality   Total 0 (09/19/21 1243)       Modified Davie Score (Pre-morbid)  0-No deficits    Imaging  I personally reviewed all imaging; relevant findings per HPI.    Labs Data   CBC  Recent Labs   Lab 09/19/21  0829 09/18/21  1222   WBC 6.9 5.9   RBC 5.05 4.62   HGB 15.7 14.4   HCT 45.1 42.0    246     Basic Metabolic Panel   Recent Labs   Lab 09/19/21  1212 09/19/21  0829 09/19/21  0754 09/18/21  2234 09/18/21  1225 09/18/21  1222   NA  --  141  --   --   --  138   POTASSIUM  --  3.8  --   --   --  4.4   CHLORIDE  --  109  --   --   --  106   CO2  --  23  --   --   --  25   BUN  --  15  --   --   --  23*   CR  --  0.94  --   --  1.1 1.10   * 117* 105*   < >  --  116   CHRIS  --  8.9  --   --   --  9.0    < > = values in this interval not displayed.     Liver Panel  No results for input(s): PROTTOTAL, ALBUMIN, BILITOTAL, ALKPHOS, AST, ALT, BILIDIRECT in the last 168 hours.  INR    Recent Labs   Lab Test 09/18/21  1222   INR 0.98      Lipid Profile    Recent Labs   Lab Test 09/19/21  0829   CHOL 231*   HDL 54   *   TRIG 188*     A1C    Recent Labs   Lab Test 09/19/21  0829   A1C 5.3     Troponin I  No results for input(s): TROPONIN in the last 168 hours.       Stroke Consult Data Data This was a non-emergent, non-telestroke consult.

## 2021-09-19 NOTE — IR NOTE
Interventional Radiology Intra-procedural Nursing Note    Patient Name: Bernabe Grullon  Medical Record Number: [id number]  Today's Date: September 19, 2021    Start Time: 1522  End of procedure time: 1541  Procedure: Diagnostic cerebral angiogram radial /femoral access  Report given to: Georgiana Castillo RN  Time pt departs:  1550    Other Notes: Pt into IR suite 3 via cart. Pt awake and alert. To table in supine position. Monitoring equipment applied. VSS. Tele SR. Dr. Ceballos in room. Time out and procedure started. Pt tolerated procedure well. Debrief with Dr. Ceballos. Angio-seal placed and pressure held until hemostasis achieved. Dressing CDI. No complications. Pt transferred back to Station 73.    Medications:    Versed 1 mg  Fentanyl 50 mcg  Lidocaine 1% 7 ml    Moose Cedeño RN

## 2021-09-19 NOTE — PROGRESS NOTES
[facility]     Endovascular Surgical Neuroradiology Pre-Procedure Note      HPI:  Bernabe Grullon is a 66 year old male presenting 2 days ago with an episode of expressive dysphagia lasting for 10 minutes and self resolved.  There was no associated limb weakness.  He had initially presented to [facility] was finally transferred to [facility] ED.  He had a screening vascular study performed in April 2021 that showed significant stenosis in the left ICA and was placed on aspirin at the time even though asymptomatic.  This MRI brain reveals a small ischemic infarct in the left parietal lobe on the CTA head revealing over 95% stenosis of the proximal segment of the left ICA.  His LDL was elevated at 139 and is currently on aspirin and Plavix.  He is a non-smoker and only drinks alcohol socially.  He is otherwise feeling well  Dr. Dunn, vascular attending was in contact with us requesting for cerebral angiogram to confirm extent of left ICA stenosis. He is planned for a carotid endarterectomy tommorow.    Medical History:  Past Medical History:   Diagnosis Date     Carotid stenosis        Surgical History:  History reviewed. No pertinent surgical history.    Family History:  Family History   Problem Relation Age of Onset     Cerebrovascular Disease Mother        Social History:  Social History     Socioeconomic History     Marital status:      Spouse name: Not on file     Number of children: Not on file     Years of education: Not on file     Highest education level: Not on file   Occupational History     Not on file   Tobacco Use     Smoking status: Never Smoker     Smokeless tobacco: Never Used   Substance and Sexual Activity     Alcohol use: Yes     Comment: 2 drinks daiy      Drug use: Never     Sexual activity: Never   Other Topics Concern     Parent/sibling w/ CABG, MI or angioplasty before 65F 55M? Not Asked   Social History Narrative     Not on file     Social  Determinants of Health     Financial Resource Strain:      Difficulty of Paying Living Expenses:    Food Insecurity:      Worried About Running Out of Food in the Last Year:      Ran Out of Food in the Last Year:    Transportation Needs:      Lack of Transportation (Medical):      Lack of Transportation (Non-Medical):    Physical Activity:      Days of Exercise per Week:      Minutes of Exercise per Session:    Stress:      Feeling of Stress :    Social Connections:      Frequency of Communication with Friends and Family:      Frequency of Social Gatherings with Friends and Family:      Attends [employer] Services:      Active Member of Clubs or Organizations:      Attends Club or Organization Meetings:      Marital Status:    Intimate Partner Violence:      Fear of Current or Ex-Partner:      Emotionally Abused:      Physically Abused:      Sexually Abused:        Allergies:  Allergies   Allergen Reactions     Penicillin G Other (See Comments)       Is there a contrast allergy?  No    Medications:  Medications Prior to Admission   Medication Sig Dispense Refill Last Dose     Ascorbic Acid 500 MG CHEW Take 500 mg by mouth daily    9/13/2021     aspirin (ASA) 81 MG EC tablet Take 81 mg by mouth daily    Past Month at Unknown time     ibuprofen (ADVIL/MOTRIN) 200 MG tablet Take 200 mg by mouth every 4 hours as needed for mild pain   9/17/2021 at Unknown time   .    ROS:  The 10 point Review of Systems is negative other than noted in the HPI or here.     PHYSICAL EXAMINATION  Vital Signs:  B/P: 139/85,  T: 98.2,  P: 58,  R: 18    Cardio:  RRR  Pulmonary:  no respiratory distress  Abdomen:  soft    Neurologic  Mental Status:  fully alert  Cranial Nerves:  visual fields intact  Motor:  no abnormal movements  Sensory:  intact/symmetric to light touch and pin prick throughout upper and lower extremities  Coordination:  FNF and HS intact without dysmetria    Pre-procedure National Institutes of Health Stroke Scale:   Not  applicable    LABS  (most recent Cr, BUN, GFR, PLT, INR, PTT within the past 7 days):  Recent Labs   Lab 09/19/21  0829 09/18/21  1225 09/18/21  1222   CR 0.94   < > 1.10   BUN 15   < > 23*   GFRESTIMATED 84   < > 69      < > 246   INR  --   --  0.98   PTT  --   --  26    < > = values in this interval not displayed.        Platelet Function P2Y12 (PRU):  Not applicable      ASSESSMENT:    PLAN:        PRE-PROCEDURE SEDATION ASSESSMENT     Pre-Procedure Sedation Assessment done at 1430.    Expected Level:  Moderate Sedation    Indication:  Sedation is required to allow for neurointerventional procedure.    Consent obtained from patient after discussing the risks, benefits and alternatives.     PO Intake:  Appropriately NPO for procedure    ASA Class:  Class 1 - HEALTHY PATIENT    Mallampati:  Grade 1:  Soft palate, uvula, tonsillar pillars, and posterior pharyngeal wall visible    History and physical reviewed and no updates needed. I have reviewed the lab findings, diagnostic data, medications, and the plan for sedation. I have determined this patient to be an appropriate candidate for the planned sedation and procedure and have reassessed the patient IMMEDIATELY PRIOR to sedation and procedure.    Patient was discussed with the Attending, Dr. Chaudhary, who agrees with the plan.    Luis Ceballos MD   Neuro IR fellow  Pager:[phone]

## 2021-09-19 NOTE — PROGRESS NOTES
VASCULAR SURGERY    Bernabe Grullon suffered a left hemisphere CVA with near complete recovery.  Found to have a critical left ICA stenosis that possibly was occluded by CTA with minimal contralateral disease and no intracerebral disease.    Cerebral angiogram was performed to make sure the left ICA was patent prior to left CEA.  This was just completed with no complications.    Images were reviewed.  There is a 99% stenosis of the proximal left ICA approximately 1 cm from its origin.  Distally this the artery is somewhat smaller due to the low flow but otherwise unremarkable.      Impression: Symptomat greater 99% stenosis of left ICA.  Neurologically patient is recovered.  Has been initiated on aspirin/Plavix.  Also will need a statin.  Plan left CEA tomorrow afternoon.  Discussed with patient and family.    N.p.o. after 0800 hrs. Tomorrow.       Puma Victor MD

## 2021-09-19 NOTE — H&P
[facility]    History and Physical  Hospitalist       Date of Admission:  9/18/2021    Assessment & Plan      This is a 66-year-old male with no known previous past medical history, who came to the ER at [facility] initially because of a speech problem.    ASSESSMENT AND PLAN:  1.  Acute ischemic stroke:  This is a 66-year-old male with no previous past medical history, who presented with garbled speech and word finding difficulty, found to have a left parietal cortical stroke and left internal carotid artery stenosis.  At this time we will admit him, start him on IV fluids, keep him on telemetry.  He was given aspirin and loaded with Plavix 300 mg in the ED at [facility].  We will continue with the aspirin at this time, start him on Lipitor 40 mg daily.  Check his hemoglobin A1c, lipid panel, neuro check every 4 hours.  Consult Stroke Neurology and Vascular Surgery.  We will do echocardiogram.  Keep him on telemetry to rule out any arrhythmia.  PT, OT, and Speech consult.  2.  High-grade left internal carotid artery stenosis.  This is most likely the cause of his stroke symptoms.  We will consult Vascular Surgery to evaluate for CEA.  3.  DVT prophylaxis with SCDs.    CODE STATUS:  Full code.    The case discussed with ER physician from [facility] and the nursing staff taking care of the patient.      Duran Eckert MD      DVT Prophylaxis: Pneumatic Compression Devices  Code Status: Full Code    Disposition: Expected discharge in 2 days once stable     Duran Eckert MD    Primary Care Physician   KIERA CASTELAN    Chief Complaint   Speech problem     History is obtained from the patient    History of Present Illness   Admitted: 09/18/2021    HISTORY OF PRESENT ILLNESS:  This is a 66-year-old male with no known previous past medical history, who came to the ER at [facility] initially because of a speech problem.    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  He said he had some word finding difficulty and the words coming out of his mouth were garbled.  It started at 11:45.  He went to the ED within 25-30 minutes.  As soon as he came to the ER his symptoms started to get better and his speech got better and symptoms resolved.    A stroke code was done at the ED at [facility] including CT scan of the head, CTA head and MRI brain was done.  CT head was negative for any acute changes.  CTA does show left internal carotid artery stenosis greater than 95%, likely some segmental occlusion.  MRI of the brain was done which does show a small acute to early subacute infarction in the left parietal cortex.  Subsequently it was decided the patient will need carotid endarterectomy and was sent to the [facility] for further management.    At this time the patient is feeling back to his baseline.  No trouble with his speech since morning.  Denies any chest pain, shortness of breath, orthopnea, PND, palpitation.  No fever, chills or cough.  No headache, dizziness, lightheadedness.  No abdominal pain, back pain, dysuria, hematuria, constipation, or diarrhea at this time.  He has had no symptoms of a stroke before like this.  Rest of the review of systems is negative.    Past Medical History    I have reviewed this patient's medical history and updated it with pertinent information if needed.   Past Medical History:   Diagnosis Date     Carotid stenosis        Past Surgical History   I have reviewed this patient's surgical history and updated it with pertinent information if needed.  History reviewed. No pertinent surgical history.    Prior to Admission Medications   Prior to Admission Medications   Prescriptions Last Dose Informant Patient Reported? Taking?   Ascorbic Acid 500 MG CHEW   Yes No   Sig: Take 500 mg by mouth daily    aspirin (ASA) 81 MG EC tablet   Yes No   Sig: Take 81 mg by mouth daily     ibuprofen (ADVIL/MOTRIN) 200 MG tablet   Yes No   Sig: Take 200 mg by mouth every 4 hours as needed for mild pain      Facility-Administered Medications: None     Allergies   Allergies   Allergen Reactions     Penicillin G Other (See Comments)       Social History   I have reviewed this patient's social history and updated it with pertinent information if needed. Bernabe Grullon  reports that he has never smoked. He has never used smokeless tobacco. He reports current alcohol use. He reports that he does not use drugs.    Family History   I have reviewed this patient's family history and updated it with pertinent information if needed.   Family History   Problem Relation Age of Onset     Cerebrovascular Disease Mother        Review of Systems   CONSTITUTIONAL:  negative  EYES:  negative  HEENT:  negative  RESPIRATORY:  negative  CARDIOVASCULAR:  negative  GASTROINTESTINAL:  negative  GENITOURINARY:  negative  INTEGUMENT/BREAST:  negative  HEMATOLOGIC/LYMPHATIC:  negative  ALLERGIC/IMMUNOLOGIC:  negative  ENDOCRINE:  negative  MUSCULOSKELETAL:  negative  NEUROLOGICAL:  positive for speech problems  BEHAVIOR/PSYCH:  negative    Physical Exam   Temp: 97.6  F (36.4  C) Temp src: Oral BP: (!) 165/96 Pulse: 52   Resp: 17 SpO2: 97 % O2 Device: None (Room air)    Vital Signs with Ranges  Temp:  [97.6  F (36.4  C)-97.8  F (36.6  C)] 97.6  F (36.4  C)  Pulse:  [46-54] 52  Resp:  [13-25] 17  BP: (143-176)/() 165/96  SpO2:  [97 %-100 %] 97 %  0 lbs 0 oz    Constitutional: Awake, alert, cooperative, no apparent distress.  Eyes: Conjunctiva and pupils examined and normal.  HEENT: Moist mucous membranes, normal dentition.  Respiratory: Clear to auscultation bilaterally, no crackles or wheezing.  Cardiovascular: Regular rate and rhythm, normal S1 and S2, and no murmur noted.  GI: Soft, non-distended, non-tender, normal bowel sounds.  Lymph/Hematologic: No anterior cervical or supraclavicular adenopathy.  Skin: No rashes, no  cyanosis, no edema.  Musculoskeletal: No joint swelling, erythema or tenderness.  Neurologic: Cranial nerves 2-12 intact, normal strength and sensation.  Psychiatric: Alert, oriented to person, place and time, no obvious anxiety or depression.    Data   Data reviewed today:  I personally reviewed the EKG tracing showing NSR, NO ACUTE ISCHEMIC CHANGES. .  Recent Labs   Lab 09/18/21  1225 09/18/21  1222 09/18/21  1219   WBC  --  5.9  --    HGB  --  14.4  --    MCV  --  91  --    PLT  --  246  --    INR  --  0.98  --    NA  --  138  --    POTASSIUM  --  4.4  --    CHLORIDE  --  106  --    CO2  --  25  --    BUN  --  23*  --    CR 1.1 1.10  --    ANIONGAP  --  7  --    CHRIS  --  9.0  --    GLC  --  116 121*       Recent Results (from the past 24 hour(s))   CTA Head Neck with Contrast    Narrative    EXAM: CTA  HEAD NECK WITH CONTRAST  LOCATION: [facility]  DATE/TIME: 9/18/2021 12:13 PM    INDICATION: Speech difficulty.  COMPARISON: None.  CONTRAST: Isovue-370 100mL   TECHNIQUE: Head and neck CT angiogram with IV contrast. Noncontrast head CT followed by axial helical CT images of the head and neck vessels obtained during the arterial phase of intravenous contrast administration. Axial 2D reconstructed images and   multiplanar 3D MIP reconstructed images of the head and neck vessels were performed by the technologist. Dose reduction techniques were used. All stenosis measurements made according to NASCET criteria unless otherwise specified.    FINDINGS:   NONCONTRAST HEAD CT:   INTRACRANIAL CONTENTS: No intracranial hemorrhage, extraaxial collection, or mass effect.  No CT evidence of acute infarct. Mild presumed chronic small vessel ischemic changes. Mild generalized volume loss. No hydrocephalus.     VISUALIZED ORBITS/SINUSES/MASTOIDS: No intraorbital abnormality. No paranasal sinus mucosal disease. No middle ear or mastoid effusion.    BONES/SOFT TISSUES: No acute abnormality.    HEAD  CTA:  ANTERIOR CIRCULATION: There is diminished caliber of the petrous segment of the left ICA with improved vessel caliber in the cavernous and supraclinoid segments. The M1 segment middle cerebral arteries are without significant stenosis or occlusion. No   intracranial aneurysm or high flow vascular malformation. Persistent fetal circulation of the right posterior cerebral artery.    POSTERIOR CIRCULATION: Dominant left and smaller right vertebral artery contribute to a normal basilar artery. Persistent fetal circulation of the right posterior cerebral artery. No significant stenosis/proximal vessel occlusion. No aneurysm or high   flow vascular malformation.    DURAL VENOUS SINUSES: The right dural venous system is dominant. There is nonopacification of the distal left transverse and sigmoid sinus, presumably related to timing.    NECK CTA:  RIGHT CAROTID: No measurable stenosis or dissection.    LEFT CAROTID: There is low attenuation within the proximal left ICA with marked narrowing or segmental occlusion. There is opacification of the vessel distal to this location and with markedly reduced caliber of the remaining cervical left ICA. There is   improved left ICA caliber in the cavernous and supraclinoid segments.    VERTEBRAL ARTERIES: No focal stenosis or dissection. Dominant left and smaller right vertebral arteries.    AORTIC ARCH: Classic aortic arch anatomy with no significant stenosis at the origin of the great vessels.    NONVASCULAR STRUCTURES: Unremarkable.      Impression    IMPRESSION:   HEAD CT:  1.  No CT evidence for acute intracranial process.  2.  Brain atrophy and presumed chronic microvascular ischemic changes as above.    HEAD CTA:   1.  No significant stenosis, aneurysm, or high flow vascular malformation identified.  2.  Variant [place] anatomy as above.    NECK CTA:  1.  There is luminal low-attenuation within the proximal left internal carotid artery just distal to its origin  favored reflect mural and/or intravascular thrombus within severe high-grade narrowing (greater than 95%) or less likely segmental occlusion.   There is markedly diminished caliber of the left cervical ICA distal to this with improved vascular caliber of the cavernous and supraclinoid left ICA.    Noncontrast imaging findings were discussed Dr. Whitley of the clinical service at 12:23 PM. CTA imaging findings were discussed with Dr. Whitley of the clinical service at 12:50 PM on 09/18/2021.     MR Brain w/o Contrast    Narrative    EXAM: MR BRAIN W/O CONTRAST  LOCATION: [facility]  DATE/TIME: 9/18/2021 2:14 PM    INDICATION: Stroke, follow up.  COMPARISON: CTA head and neck 09/18/2021.  TECHNIQUE: Routine multiplanar multisequence head MRI without intravenous contrast.    FINDINGS:  INTRACRANIAL CONTENTS: Small areas of diffusion restriction within the left postcentral gyrus and parietal cortex with mild T2/FLAIR hyperintensity. The larger lesion within the parietal cortex measures 7 mm. These are consistent with acute to early   subacute infarctions.    No mass, acute hemorrhage, or extra-axial fluid collections. Scattered nonspecific T2/FLAIR hyperintensities within the cerebral white matter most consistent with mild chronic microvascular ischemic change. Mild generalized cerebral atrophy. No   hydrocephalus. Normal position of the cerebellar tonsils.     SELLA: No abnormality accounting for technique.    OSSEOUS STRUCTURES/SOFT TISSUES: Normal marrow signal. Abnormal signal within the left internal carotid artery correlating with known high-grade stenosis seen on the previous CTA. Otherwise normal flow voids within the major vessels.     ORBITS: No abnormality accounting for technique.     SINUSES/MASTOIDS: No paranasal sinus mucosal disease. No middle ear or mastoid effusion.       Impression    IMPRESSION:  1.  Small acute to early subacute infarctions within the left parietal  cortex.  2.  Abnormal signal within the left internal carotid artery consistent with high-grade stenosis demonstrated on previous CTA.   3.  Mild age-related changes.

## 2021-09-19 NOTE — PROGRESS NOTES
I:  SW consult for discharge planning was closed. Physical therapy states pt is at baseline; no needs. OT recommends home; no needs.    P: No SW intervention anticipated at this time.    ROBIN Webb   [facility]  [phone]

## 2021-09-19 NOTE — PHARMACY-CONSULT NOTE
Pharmacy Consult to evaluate for medication related stroke core measures    Bernabe Grullon, 66 year old male admitted for ischemic stroke and carotid artery stenosis on 9/18/2021.    Thrombolytic was not given because of notation of low NIH stroke scale    VTE Prophylaxis SCDs /PCDs placed on 9/18/21, as appropriate prior to end of hospital day 2.    Antithrombotic: aspirin and clopidogrel started on 9/18/21, as appropriate by end of hospital day 2. Continue antithrombotic therapy on discharge to meet quality measures, unless contraindicated.    Anticoagulation if history of A-fib/flutter: Patient does not have history of A-fib/flutter - anticoagulation not required for medication related stroke core measures.     Patient currently receiving Lipitor (atorvastatin) continue statin on discharge to meet quality measures, unless contraindicated.    Recommendations: Will defer to neurology if continue Plavix 75mg daily.    Thank you for the consult.    Yamilex Eastman [facility] 9/18/2021 9:53 PM

## 2021-09-19 NOTE — PROGRESS NOTES
09/19/21 0801   Quick Adds   Type of Visit Initial Occupational Therapy Evaluation   Living Environment   People in home spouse   Current Living Arrangements house   Home Accessibility no concerns   Transportation Anticipated car, drives self   Living Environment Comments single level home   Self-Care   Usual Activity Tolerance good   Current Activity Tolerance good   Regular Exercise Yes   Activity/Exercise Type other (see comments)  (golf)   Activity/Exercise/Self-Care Comment Works as a furniture and brenda consultant. Indep at baseline with IADLs and ADLs.    Disability/Function   Fall history within last six months no   General Information   Onset of Illness/Injury or Date of Surgery 09/18/21   Referring Physician Duran Eckert MD   Additional Occupational Profile Info/Pertinent History of Current Problem Bernabe Grullon is a 66 year old year old male with no known medical history presented to the ER with slurred speech around noon yesterday.  Symptoms resolved in the ER and he has no residual deficits.  He underwent CTA that showed high-grade left ICA stenosis with calcific plaque and mural thrombus greater than 95% and MRI findings of a left subacute parietal infarct.  He states he had an ultrasound that showed he had less than 50% narrowing in [place] 6 months ago.  Does not take a baby aspirin or statin.  No history of tobacco use.   Existing Precautions/Restrictions no known precautions/restrictions   Cognitive Status Examination   Orientation Status orientation to person, place and time   Visual Perception   Visual Impairment/Limitations corrective lenses for reading   Sensory   Sensory Quick Adds Other (describe)   Sensory Comments Slight numbness in B toes but this is baseline   Pain Assessment   Patient Currently in Pain Yes, see Vital Sign flowsheet  (Pain in knee that is baseline, causes slight limp in gait)   Integumentary/Edema   Integumentary/Edema no deficits were identifed    Posture   Posture not impaired   Range of Motion Comprehensive   General Range of Motion no range of motion deficits identified   Strength Comprehensive (MMT)   General Manual Muscle Testing (MMT) Assessment no strength deficits identified   Comment, General Manual Muscle Testing (MMT) Assessment 5/5 strength throughout   Coordination   Upper Extremity Coordination No deficits were identified   Bed Mobility   Bed Mobility No deficits identified   Transfers   Transfers No deficits identified   Transfer Comments Indep. Ambulates >500ft with no device.    Activities of Daily Living   BADL Assessment lower body dressing;toileting   Lower Body Dressing Assessment   [place] Level (Lower Body Dressing) independent   Toileting   [place] Level (Toileting) independent   Clinical Impression   Criteria for Skilled Therapeutic Interventions Met (OT) no   Clinical Decision Making Complexity (OT) low complexity   Therapy Frequency (OT) 1x eval   Risk & Benefits of therapy have been explained evaluation/treatment results reviewed;care plan/treatment goals reviewed   OT Discharge Planning    OT Discharge Recommendation (DC Rec) home   OT Rationale for DC Rec Pt at baseline for functional mobility and self-cares. Does have support from wife at home, if needed. No anticpated needs at d/c.    Total Evaluation Time (Minutes)   Total Evaluation Time (Minutes) 15

## 2021-09-19 NOTE — PROGRESS NOTES
Medicine Progress Note - Hospitalist Service       Date of Admission:  9/18/2021    Assessment & Plan           Patient is a 66-year-old male with no previous past medical history, who presented with garbled speech and word finding difficulty, found to have a left parietal cortical stroke and left internal carotid artery stenosis.    Acute ischemic stroke  High grade left internal carotid artery stenosis  CT head no acute intracranial process.CTA neck- There is luminal low-attenuation within the proximal left internal carotid artery just distal to its origin favored reflect mural and/or intravascular thrombus within severe high-grade narrowing (greater than 95%) or less likely segmental occlusion. There is markedly diminished caliber of the left cervical ICA distal to this with improved vascular caliber of the cavernous and supraclinoid left ICA.  MRI of the brain-Small acute to early subacute infarctions within the left parietal cortex. Abnormal signal within the left internal carotid artery consistent with high-grade stenosis demonstrated on previous CTA.    S/p ASA and plavix load in the ED  - continue with ASA and statin.   - defer to neurology regarding Plavix  - follow A1c, lipid panel  - echo pending  - Neurology consult  - vascular surgery consulted-->   - Cerebral angiogram to ensure the ICA is not occluded   - plan for left CEA on Tuesday  - SLP, PT/OT consult  - continue neuro checks, telemetery       Diet: Regular Diet Adult    DVT Prophylaxis: Pneumatic Compression Devices  Clayton Catheter: Not present  Central Lines: None  Code Status: Full Code      Disposition Plan   Expected discharge: 09/20/2021   recommended to Pneumatic Compression Devices and Ambulate every shift once pending above work up and surgery for carotid stenosis.     The patient's care was discussed with the Bedside Nurse and Patient.    Melany Moses MD  Hospitalist Service  [facility]  [facility]  Securely message with the [employer] Web Console (learn more here)  Text page via [employer] Paging/Directory      Clinically Significant Risk Factors Present on Admission              # Platelet Defect: home medication list includes an antiplatelet medication      ______________________________________________________________________    Interval History   Patient reports speech difficulty has resolved. Reports minor headache which he rates 1/10.   Denies visual disturbance, focal weakness, nausea or vomiting.     Data reviewed today: I reviewed all medications, new labs and imaging results over the last 24 hours. I personally reviewed the brain MRI and CT image(s) showing as mentioned above.    Physical Exam   Vital Signs: Temp: 98.1  F (36.7  C) Temp src: Oral BP: (!) 147/87 Pulse: 53   Resp: 16 SpO2: 97 % O2 Device: None (Room air)    Weight: 0 lbs 0 oz  General Appearance: Alert,awake and oriented  Respiratory: clear to auscultation bilaterally, no wheezing  Cardiovascular: regular rate and rhythm  GI: soft and non-tender  Skin: warm and dry    Data   Recent Labs   Lab 09/19/21  0829 09/19/21  0754 09/19/21  0533 09/19/21  0050 09/18/21  2234 09/18/21  1225 09/18/21  1222   WBC 6.9  --   --   --   --   --  5.9   HGB 15.7  --   --   --   --   --  14.4   MCV 89  --   --   --   --   --  91     --   --   --   --   --  246   INR  --   --   --   --   --   --  0.98   NA  --   --   --   --   --   --  138   POTASSIUM  --   --   --   --   --   --  4.4   CHLORIDE  --   --   --   --   --   --  106   CO2  --   --   --   --   --   --  25   BUN  --   --   --   --   --   --  23*   CR  --   --   --   --   --  1.1 1.10   ANIONGAP  --   --   --   --   --   --  7   CHRIS  --   --   --   --   --   --  9.0   GLC  --  105* 107* 124*   < >  --  116    < > = values in this interval not displayed.     Recent Results (from the past 24 hour(s))   CTA Head Neck with Contrast    Narrative    EXAM: CTA  HEAD NECK WITH  CONTRAST  LOCATION: [facility]  DATE/TIME: 9/18/2021 12:13 PM    INDICATION: Speech difficulty.  COMPARISON: None.  CONTRAST: Isovue-370 100mL   TECHNIQUE: Head and neck CT angiogram with IV contrast. Noncontrast head CT followed by axial helical CT images of the head and neck vessels obtained during the arterial phase of intravenous contrast administration. Axial 2D reconstructed images and   multiplanar 3D MIP reconstructed images of the head and neck vessels were performed by the technologist. Dose reduction techniques were used. All stenosis measurements made according to NASCET criteria unless otherwise specified.    FINDINGS:   NONCONTRAST HEAD CT:   INTRACRANIAL CONTENTS: No intracranial hemorrhage, extraaxial collection, or mass effect.  No CT evidence of acute infarct. Mild presumed chronic small vessel ischemic changes. Mild generalized volume loss. No hydrocephalus.     VISUALIZED ORBITS/SINUSES/MASTOIDS: No intraorbital abnormality. No paranasal sinus mucosal disease. No middle ear or mastoid effusion.    BONES/SOFT TISSUES: No acute abnormality.    HEAD CTA:  ANTERIOR CIRCULATION: There is diminished caliber of the petrous segment of the left ICA with improved vessel caliber in the cavernous and supraclinoid segments. The M1 segment middle cerebral arteries are without significant stenosis or occlusion. No   intracranial aneurysm or high flow vascular malformation. Persistent fetal circulation of the right posterior cerebral artery.    POSTERIOR CIRCULATION: Dominant left and smaller right vertebral artery contribute to a normal basilar artery. Persistent fetal circulation of the right posterior cerebral artery. No significant stenosis/proximal vessel occlusion. No aneurysm or high   flow vascular malformation.    DURAL VENOUS SINUSES: The right dural venous system is dominant. There is nonopacification of the distal left transverse and sigmoid sinus, presumably related to  timing.    NECK CTA:  RIGHT CAROTID: No measurable stenosis or dissection.    LEFT CAROTID: There is low attenuation within the proximal left ICA with marked narrowing or segmental occlusion. There is opacification of the vessel distal to this location and with markedly reduced caliber of the remaining cervical left ICA. There is   improved left ICA caliber in the cavernous and supraclinoid segments.    VERTEBRAL ARTERIES: No focal stenosis or dissection. Dominant left and smaller right vertebral arteries.    AORTIC ARCH: Classic aortic arch anatomy with no significant stenosis at the origin of the great vessels.    NONVASCULAR STRUCTURES: Unremarkable.      Impression    IMPRESSION:   HEAD CT:  1.  No CT evidence for acute intracranial process.  2.  Brain atrophy and presumed chronic microvascular ischemic changes as above.    HEAD CTA:   1.  No significant stenosis, aneurysm, or high flow vascular malformation identified.  2.  Variant [place] of [place] anatomy as above.    NECK CTA:  1.  There is luminal low-attenuation within the proximal left internal carotid artery just distal to its origin favored reflect mural and/or intravascular thrombus within severe high-grade narrowing (greater than 95%) or less likely segmental occlusion.   There is markedly diminished caliber of the left cervical ICA distal to this with improved vascular caliber of the cavernous and supraclinoid left ICA.    Noncontrast imaging findings were discussed Dr. Whitley of the clinical service at 12:23 PM. CTA imaging findings were discussed with Dr. Whitley of the clinical service at 12:50 PM on 09/18/2021.     MR Brain w/o Contrast    Narrative    EXAM: MR BRAIN W/O CONTRAST  LOCATION: [facility]  DATE/TIME: 9/18/2021 2:14 PM    INDICATION: Stroke, follow up.  COMPARISON: CTA head and neck 09/18/2021.  TECHNIQUE: Routine multiplanar multisequence head MRI without intravenous contrast.    FINDINGS:  INTRACRANIAL  CONTENTS: Small areas of diffusion restriction within the left postcentral gyrus and parietal cortex with mild T2/FLAIR hyperintensity. The larger lesion within the parietal cortex measures 7 mm. These are consistent with acute to early   subacute infarctions.    No mass, acute hemorrhage, or extra-axial fluid collections. Scattered nonspecific T2/FLAIR hyperintensities within the cerebral white matter most consistent with mild chronic microvascular ischemic change. Mild generalized cerebral atrophy. No   hydrocephalus. Normal position of the cerebellar tonsils.     SELLA: No abnormality accounting for technique.    OSSEOUS STRUCTURES/SOFT TISSUES: Normal marrow signal. Abnormal signal within the left internal carotid artery correlating with known high-grade stenosis seen on the previous CTA. Otherwise normal flow voids within the major vessels.     ORBITS: No abnormality accounting for technique.     SINUSES/MASTOIDS: No paranasal sinus mucosal disease. No middle ear or mastoid effusion.       Impression    IMPRESSION:  1.  Small acute to early subacute infarctions within the left parietal cortex.  2.  Abnormal signal within the left internal carotid artery consistent with high-grade stenosis demonstrated on previous CTA.   3.  Mild age-related changes.     Medications     - MEDICATION INSTRUCTIONS -       - MEDICATION INSTRUCTIONS -         aspirin  81 mg Oral Daily    Or     aspirin  81 mg Oral or NG Tube Daily     atorvastatin  40 mg Oral or Feeding Tube QPM     sodium chloride (PF)  3 mL Intracatheter Q8H

## 2021-09-19 NOTE — PROVIDER NOTIFICATION
"Text sent to Dr. Moses: \"Patient may possibly have cerebral angio today but it is not confirmed. Did you want to put him on some IVF?\"  "

## 2021-09-19 NOTE — PLAN OF CARE
Physical Therapy: Orders received. Chart reviewed and discussed with care team.? Physical Therapy not indicated due to pt at baseline ambulation and mobility.? Defer discharge recommendations to OT and medical team.? Will complete orders.       Coby Treviño, PT on 9/19/2021 at 1:04 PM

## 2021-09-19 NOTE — PLAN OF CARE
SLP - Order for swallow evaluation received.  Pt is NPO for procedure today and then will have to stay flat in bed.  Deferred evaluation to tomorrow, Monday.

## 2021-09-19 NOTE — H&P
Admitted: 09/18/2021    HISTORY OF PRESENT ILLNESS:  This is a 66-year-old male with no known previous past medical history, who came to the ER at [facility] initially because of a speech problem.    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  He said he had some word finding difficulty and the words coming out of his mouth were garbled.  It started at 11:45.  He went to the ED within 25-30 minutes.  As soon as he came to the ER his symptoms started to get better and his speech got better and symptoms resolved.    A stroke code was done at the ED at [facility] including CT scan of the head, CTA head and MRI brain was done.  CT head was negative for any acute changes.  CTA does show left internal carotid artery stenosis greater than 95%, likely some segmental occlusion.  MRI of the brain was done which does show a small acute to early subacute infarction in the left parietal cortex.  Subsequently it was decided the patient will need carotid endarterectomy and was sent to the [facility] for further management.    At this time the patient is feeling back to his baseline.  No trouble with his speech since morning.  Denies any chest pain, shortness of breath, orthopnea, PND, palpitation.  No fever, chills or cough.  No headache, dizziness, lightheadedness.  No abdominal pain, back pain, dysuria, hematuria, constipation, or diarrhea at this time.  He has had no symptoms of a stroke before like this.  Rest of the review of systems is negative.    ASSESSMENT AND PLAN:  1.  Acute ischemic stroke:  This is a 66-year-old male with no previous past medical history, who presented with garbled speech and word finding difficulty, found to have a left parietal cortical stroke and left internal carotid artery stenosis.  At this time we will admit him, start him on IV fluids, keep him on telemetry.  He was given aspirin and  loaded with Plavix 300 mg in the ED at [facility].  We will continue with the aspirin at this time, start him on Lipitor 40 mg daily.  Check his hemoglobin A1c, lipid panel, neuro check every 4 hours.  Consult Stroke Neurology and Vascular Surgery.  We will do echocardiogram.  Keep him on telemetry to rule out any arrhythmia.  PT, OT, and Speech consult.  2.  High-grade left internal carotid artery stenosis.  This is most likely the cause of his stroke symptoms.  We will consult Vascular Surgery to evaluate for CEA.  3.  DVT prophylaxis with SCDs.    CODE STATUS:  Full code.    The case discussed with ER physician from [facility] and the nursing staff taking care of the patient.      Duran Eckert MD        D: 2021   T: 2021   MT: [facility]    Name:     BRANDON ROMEO  MRN:      [id number]        Account:     [id number]   :      1955           Admitted:    2021       Document: [id number]    cc:  Tate Tafoya MD

## 2021-09-19 NOTE — PROCEDURES
[facility]     Endovascular Surgical Neuroradiology Post-Procedure Note    Pre-Procedure Diagnosis: Left hemispheric CVA with over 95% stenosis of the left ICA  Post-Procedure Diagnosis: Left hemispheric CVA with 99% stenosis of the left ICA    Procedure(s):   Diagnostic cervicocerebral angiography    Findings: Right common carotid injection revealed over 99% stenosis of the proximal left ICA.  There is reconstitution of the intracranial segment of the left ICA with collateral supply from the left ECA    Plan: Transfer back to the [facility] and plan for carotid endarterectomy tomorrow under the vascular team    Primary Surgeon:  Dr. Lele Chaudhary  Secondary Surgeon:  Not applicable  Secondary Surgeon Review:  None  Fellow:   Additional Assistants:    Prior to the start of the procedure and with procedural staff participation, I verbally confirmed: the patient s identity using two indicators, relevant allergies, that the procedure was appropriate and matched the consent or emergent situation, and that the correct equipment/implants were available. Immediately prior to starting the procedure I conducted the Time Out with the procedural staff and re-confirmed the patient s name, procedure, and site/side. (The Joint Commission universal protocol was followed.)  Yes    PRU value: Not applicable    Anesthesia:  Conscious Sedation  Medications:  Fentanyl 50 mcg, 1% plain lidocaine 7 cc  Puncture site: Right femoral    Fluoroscopy time (minutes): 4.9  Radiation dose (mGy):  216.52    Contrast amount (mL):  18     Estimated blood loss (mL): 20    Closure:  Device    Disposition:  Will be followed in hospital by the Vascular team.        Sedation Post-Procedure Summary    Sedatives: Midazolam (Versed) 1 mg    Vital signs and pulse oximetry were monitored and remained stable throughout the procedure, and sedation was maintained until the procedure was complete.  The patient was monitored by  staff until sedation discharge criteria were met.    Patient tolerance:  Patient tolerated the procedure well with no immediate complications.    Time of sedation in minutes: 19 minutes from beginning to end of physician one to one monitoring.    Luis Ceballos MD  Neuro IR fellow  Pager: [phone]

## 2021-09-19 NOTE — PLAN OF CARE
Pt here with ischemic stroke and carotid artery stenosis. A/O x4. Neuros intact except for RL- foot numbness(baseline). VSS except for hypertensive. Tele SB. NPO . Up with SBA. No BM for the shift. Voiding w/o difficulties. Denies pain. Pt scoring green on the Aggression Stop Light Tool. Plan of neurosurgery consult. Discharge to pending.

## 2021-09-20 ENCOUNTER — ANESTHESIA EVENT (OUTPATIENT)
Dept: SURGERY | Facility: CLINIC | Age: 66
DRG: 039 | End: 2021-09-20
Payer: COMMERCIAL

## 2021-09-20 ENCOUNTER — APPOINTMENT (OUTPATIENT)
Dept: CARDIOLOGY | Facility: CLINIC | Age: 66
DRG: 039 | End: 2021-09-20
Attending: INTERNAL MEDICINE
Payer: COMMERCIAL

## 2021-09-20 ENCOUNTER — ANESTHESIA (OUTPATIENT)
Dept: SURGERY | Facility: CLINIC | Age: 66
DRG: 039 | End: 2021-09-20
Payer: COMMERCIAL

## 2021-09-20 LAB — LVEF ECHO: NORMAL

## 2021-09-20 PROCEDURE — 93306 TTE W/DOPPLER COMPLETE: CPT | Mod: 26 | Performed by: INTERNAL MEDICINE

## 2021-09-20 PROCEDURE — 999N000128 HC STATISTIC PERIPHERAL IV START W/O US GUIDANCE

## 2021-09-20 PROCEDURE — 250N000025 HC SEVOFLURANE, PER MIN: Performed by: SURGERY

## 2021-09-20 PROCEDURE — 250N000013 HC RX MED GY IP 250 OP 250 PS 637: Performed by: INTERNAL MEDICINE

## 2021-09-20 PROCEDURE — 250N000009 HC RX 250: Performed by: ANESTHESIOLOGY

## 2021-09-20 PROCEDURE — 272N000001 HC OR GENERAL SUPPLY STERILE: Performed by: SURGERY

## 2021-09-20 PROCEDURE — 710N000009 HC RECOVERY PHASE 1, LEVEL 1, PER MIN: Performed by: SURGERY

## 2021-09-20 PROCEDURE — 250N000013 HC RX MED GY IP 250 OP 250 PS 637: Performed by: NURSE PRACTITIONER

## 2021-09-20 PROCEDURE — 272N000282 HC OR IOM SUPPLIES OPNP: Performed by: SURGERY

## 2021-09-20 PROCEDURE — 999N000141 HC STATISTIC PRE-PROCEDURE NURSING ASSESSMENT: Performed by: SURGERY

## 2021-09-20 PROCEDURE — 258N000003 HC RX IP 258 OP 636: Performed by: ANESTHESIOLOGY

## 2021-09-20 PROCEDURE — 99232 SBSQ HOSP IP/OBS MODERATE 35: CPT | Performed by: HOSPITALIST

## 2021-09-20 PROCEDURE — 120N000001 HC R&B MED SURG/OB

## 2021-09-20 PROCEDURE — 250N000011 HC RX IP 250 OP 636: Performed by: ANESTHESIOLOGY

## 2021-09-20 PROCEDURE — 360N000077 HC SURGERY LEVEL 4, PER MIN: Performed by: SURGERY

## 2021-09-20 PROCEDURE — 250N000011 HC RX IP 250 OP 636: Performed by: SURGERY

## 2021-09-20 PROCEDURE — 03CL0ZZ EXTIRPATION OF MATTER FROM LEFT INTERNAL CAROTID ARTERY, OPEN APPROACH: ICD-10-PCS | Performed by: SURGERY

## 2021-09-20 PROCEDURE — 250N000009 HC RX 250: Performed by: SURGERY

## 2021-09-20 PROCEDURE — 258N000003 HC RX IP 258 OP 636: Performed by: HOSPITALIST

## 2021-09-20 PROCEDURE — 250N000013 HC RX MED GY IP 250 OP 250 PS 637: Performed by: STUDENT IN AN ORGANIZED HEALTH CARE EDUCATION/TRAINING PROGRAM

## 2021-09-20 PROCEDURE — 922N000001 HC NEURO MONITORING SERVICE, UP TO 7 HOURS (T1FEE): Performed by: SURGERY

## 2021-09-20 PROCEDURE — 999N000208 ECHOCARDIOGRAM COMPLETE

## 2021-09-20 PROCEDURE — 258N000003 HC RX IP 258 OP 636: Performed by: SURGERY

## 2021-09-20 PROCEDURE — 255N000002 HC RX 255 OP 636: Performed by: INTERNAL MEDICINE

## 2021-09-20 PROCEDURE — 370N000017 HC ANESTHESIA TECHNICAL FEE, PER MIN: Performed by: SURGERY

## 2021-09-20 PROCEDURE — 35301 RECHANNELING OF ARTERY: CPT | Mod: LT | Performed by: SURGERY

## 2021-09-20 PROCEDURE — 03UL0KZ SUPPLEMENT LEFT INTERNAL CAROTID ARTERY WITH NONAUTOLOGOUS TISSUE SUBSTITUTE, OPEN APPROACH: ICD-10-PCS | Performed by: SURGERY

## 2021-09-20 PROCEDURE — 258N000003 HC RX IP 258 OP 636: Performed by: STUDENT IN AN ORGANIZED HEALTH CARE EDUCATION/TRAINING PROGRAM

## 2021-09-20 RX ORDER — PROPOFOL 10 MG/ML
INJECTION, EMULSION INTRAVENOUS PRN
Status: DISCONTINUED | OUTPATIENT
Start: 2021-09-20 | End: 2021-09-20

## 2021-09-20 RX ORDER — ONDANSETRON 4 MG/1
4 TABLET, ORALLY DISINTEGRATING ORAL EVERY 6 HOURS PRN
Status: DISCONTINUED | OUTPATIENT
Start: 2021-09-20 | End: 2021-09-21 | Stop reason: HOSPADM

## 2021-09-20 RX ORDER — HYDROXYZINE HYDROCHLORIDE 10 MG/1
10 TABLET, FILM COATED ORAL EVERY 6 HOURS PRN
Status: DISCONTINUED | OUTPATIENT
Start: 2021-09-20 | End: 2021-09-21 | Stop reason: HOSPADM

## 2021-09-20 RX ORDER — CLINDAMYCIN PHOSPHATE 900 MG/50ML
900 INJECTION, SOLUTION INTRAVENOUS
Status: COMPLETED | OUTPATIENT
Start: 2021-09-20 | End: 2021-09-20

## 2021-09-20 RX ORDER — NEOSTIGMINE METHYLSULFATE 1 MG/ML
VIAL (ML) INJECTION PRN
Status: DISCONTINUED | OUTPATIENT
Start: 2021-09-20 | End: 2021-09-20

## 2021-09-20 RX ORDER — ONDANSETRON 4 MG/1
4 TABLET, ORALLY DISINTEGRATING ORAL EVERY 30 MIN PRN
Status: DISCONTINUED | OUTPATIENT
Start: 2021-09-20 | End: 2021-09-21 | Stop reason: HOSPADM

## 2021-09-20 RX ORDER — ONDANSETRON 2 MG/ML
4 INJECTION INTRAMUSCULAR; INTRAVENOUS EVERY 30 MIN PRN
Status: DISCONTINUED | OUTPATIENT
Start: 2021-09-20 | End: 2021-09-21 | Stop reason: HOSPADM

## 2021-09-20 RX ORDER — NALOXONE HYDROCHLORIDE 0.4 MG/ML
0.4 INJECTION, SOLUTION INTRAMUSCULAR; INTRAVENOUS; SUBCUTANEOUS
Status: DISCONTINUED | OUTPATIENT
Start: 2021-09-20 | End: 2021-09-21 | Stop reason: HOSPADM

## 2021-09-20 RX ORDER — AMOXICILLIN 250 MG
1 CAPSULE ORAL 2 TIMES DAILY
Status: DISCONTINUED | OUTPATIENT
Start: 2021-09-20 | End: 2021-09-21 | Stop reason: HOSPADM

## 2021-09-20 RX ORDER — NALOXONE HYDROCHLORIDE 0.4 MG/ML
0.2 INJECTION, SOLUTION INTRAMUSCULAR; INTRAVENOUS; SUBCUTANEOUS
Status: DISCONTINUED | OUTPATIENT
Start: 2021-09-20 | End: 2021-09-21 | Stop reason: HOSPADM

## 2021-09-20 RX ORDER — ONDANSETRON 2 MG/ML
INJECTION INTRAMUSCULAR; INTRAVENOUS PRN
Status: DISCONTINUED | OUTPATIENT
Start: 2021-09-20 | End: 2021-09-20

## 2021-09-20 RX ORDER — HEPARIN SODIUM 1000 [USP'U]/ML
INJECTION, SOLUTION INTRAVENOUS; SUBCUTANEOUS PRN
Status: DISCONTINUED | OUTPATIENT
Start: 2021-09-20 | End: 2021-09-20 | Stop reason: HOSPADM

## 2021-09-20 RX ORDER — ACETAMINOPHEN 325 MG/1
650 TABLET ORAL EVERY 4 HOURS PRN
Status: DISCONTINUED | OUTPATIENT
Start: 2021-09-23 | End: 2021-09-20

## 2021-09-20 RX ORDER — OXYCODONE HYDROCHLORIDE 5 MG/1
5 TABLET ORAL EVERY 4 HOURS PRN
Status: DISCONTINUED | OUTPATIENT
Start: 2021-09-20 | End: 2021-09-20

## 2021-09-20 RX ORDER — PROCHLORPERAZINE MALEATE 5 MG
5 TABLET ORAL EVERY 6 HOURS PRN
Status: DISCONTINUED | OUTPATIENT
Start: 2021-09-20 | End: 2021-09-21 | Stop reason: HOSPADM

## 2021-09-20 RX ORDER — DEXAMETHASONE SODIUM PHOSPHATE 4 MG/ML
INJECTION, SOLUTION INTRA-ARTICULAR; INTRALESIONAL; INTRAMUSCULAR; INTRAVENOUS; SOFT TISSUE PRN
Status: DISCONTINUED | OUTPATIENT
Start: 2021-09-20 | End: 2021-09-20

## 2021-09-20 RX ORDER — GLYCOPYRROLATE 0.2 MG/ML
INJECTION, SOLUTION INTRAMUSCULAR; INTRAVENOUS PRN
Status: DISCONTINUED | OUTPATIENT
Start: 2021-09-20 | End: 2021-09-20

## 2021-09-20 RX ORDER — ONDANSETRON 2 MG/ML
4 INJECTION INTRAMUSCULAR; INTRAVENOUS EVERY 6 HOURS PRN
Status: DISCONTINUED | OUTPATIENT
Start: 2021-09-20 | End: 2021-09-21 | Stop reason: HOSPADM

## 2021-09-20 RX ORDER — HEPARIN SODIUM 1000 [USP'U]/ML
INJECTION, SOLUTION INTRAVENOUS; SUBCUTANEOUS PRN
Status: DISCONTINUED | OUTPATIENT
Start: 2021-09-20 | End: 2021-09-20

## 2021-09-20 RX ORDER — ACETAMINOPHEN 325 MG/1
975 TABLET ORAL EVERY 8 HOURS
Status: DISCONTINUED | OUTPATIENT
Start: 2021-09-20 | End: 2021-09-21 | Stop reason: HOSPADM

## 2021-09-20 RX ORDER — EPHEDRINE SULFATE 50 MG/ML
INJECTION, SOLUTION INTRAMUSCULAR; INTRAVENOUS; SUBCUTANEOUS PRN
Status: DISCONTINUED | OUTPATIENT
Start: 2021-09-20 | End: 2021-09-20

## 2021-09-20 RX ORDER — SODIUM CHLORIDE, SODIUM LACTATE, POTASSIUM CHLORIDE, CALCIUM CHLORIDE 600; 310; 30; 20 MG/100ML; MG/100ML; MG/100ML; MG/100ML
INJECTION, SOLUTION INTRAVENOUS CONTINUOUS
Status: DISCONTINUED | OUTPATIENT
Start: 2021-09-20 | End: 2021-09-21 | Stop reason: ALTCHOICE

## 2021-09-20 RX ORDER — LIDOCAINE HYDROCHLORIDE 20 MG/ML
INJECTION, SOLUTION INFILTRATION; PERINEURAL PRN
Status: DISCONTINUED | OUTPATIENT
Start: 2021-09-20 | End: 2021-09-20

## 2021-09-20 RX ORDER — POLYETHYLENE GLYCOL 3350 17 G/17G
17 POWDER, FOR SOLUTION ORAL DAILY
Status: DISCONTINUED | OUTPATIENT
Start: 2021-09-21 | End: 2021-09-21 | Stop reason: HOSPADM

## 2021-09-20 RX ORDER — BISACODYL 10 MG
10 SUPPOSITORY, RECTAL RECTAL DAILY PRN
Status: DISCONTINUED | OUTPATIENT
Start: 2021-09-20 | End: 2021-09-21 | Stop reason: HOSPADM

## 2021-09-20 RX ORDER — HYDROMORPHONE HYDROCHLORIDE 1 MG/ML
0.5 INJECTION, SOLUTION INTRAMUSCULAR; INTRAVENOUS; SUBCUTANEOUS EVERY 5 MIN PRN
Status: ACTIVE | OUTPATIENT
Start: 2021-09-20 | End: 2021-09-20

## 2021-09-20 RX ORDER — KETOROLAC TROMETHAMINE 30 MG/ML
INJECTION, SOLUTION INTRAMUSCULAR; INTRAVENOUS PRN
Status: DISCONTINUED | OUTPATIENT
Start: 2021-09-20 | End: 2021-09-20

## 2021-09-20 RX ORDER — ASPIRIN 300 MG/1
600 SUPPOSITORY RECTAL ONCE
Status: CANCELLED | OUTPATIENT
Start: 2021-09-20 | End: 2021-09-20

## 2021-09-20 RX ORDER — SODIUM CHLORIDE 9 MG/ML
INJECTION, SOLUTION INTRAVENOUS CONTINUOUS
Status: DISCONTINUED | OUTPATIENT
Start: 2021-09-20 | End: 2021-09-21

## 2021-09-20 RX ORDER — OXYCODONE HYDROCHLORIDE 5 MG/1
5 TABLET ORAL EVERY 4 HOURS PRN
Status: DISCONTINUED | OUTPATIENT
Start: 2021-09-20 | End: 2021-09-21 | Stop reason: HOSPADM

## 2021-09-20 RX ORDER — OXYCODONE HYDROCHLORIDE 5 MG/1
10 TABLET ORAL EVERY 4 HOURS PRN
Status: DISCONTINUED | OUTPATIENT
Start: 2021-09-20 | End: 2021-09-21 | Stop reason: HOSPADM

## 2021-09-20 RX ORDER — BUPIVACAINE HYDROCHLORIDE 5 MG/ML
INJECTION, SOLUTION PERINEURAL PRN
Status: DISCONTINUED | OUTPATIENT
Start: 2021-09-20 | End: 2021-09-20 | Stop reason: HOSPADM

## 2021-09-20 RX ORDER — SODIUM CHLORIDE 9 MG/ML
INJECTION, SOLUTION INTRAVENOUS CONTINUOUS
Status: ACTIVE | OUTPATIENT
Start: 2021-09-20 | End: 2021-09-20

## 2021-09-20 RX ORDER — LIDOCAINE 40 MG/G
CREAM TOPICAL
Status: DISCONTINUED | OUTPATIENT
Start: 2021-09-20 | End: 2021-09-21 | Stop reason: HOSPADM

## 2021-09-20 RX ORDER — REMIFENTANIL HYDROCHLORIDE 1 MG/ML
INJECTION, POWDER, LYOPHILIZED, FOR SOLUTION INTRAVENOUS CONTINUOUS PRN
Status: DISCONTINUED | OUTPATIENT
Start: 2021-09-20 | End: 2021-09-20

## 2021-09-20 RX ORDER — FENTANYL CITRATE 50 UG/ML
INJECTION, SOLUTION INTRAMUSCULAR; INTRAVENOUS PRN
Status: DISCONTINUED | OUTPATIENT
Start: 2021-09-20 | End: 2021-09-20

## 2021-09-20 RX ORDER — MAGNESIUM HYDROXIDE 1200 MG/15ML
LIQUID ORAL PRN
Status: DISCONTINUED | OUTPATIENT
Start: 2021-09-20 | End: 2021-09-20 | Stop reason: HOSPADM

## 2021-09-20 RX ORDER — HYDROXYZINE HYDROCHLORIDE 10 MG/1
10 TABLET, FILM COATED ORAL EVERY 6 HOURS PRN
Status: DISCONTINUED | OUTPATIENT
Start: 2021-09-20 | End: 2021-09-20

## 2021-09-20 RX ORDER — LABETALOL HYDROCHLORIDE 5 MG/ML
10 INJECTION, SOLUTION INTRAVENOUS EVERY 10 MIN PRN
Status: DISCONTINUED | OUTPATIENT
Start: 2021-09-20 | End: 2021-09-21 | Stop reason: HOSPADM

## 2021-09-20 RX ORDER — FENTANYL CITRATE 50 UG/ML
50 INJECTION, SOLUTION INTRAMUSCULAR; INTRAVENOUS EVERY 5 MIN PRN
Status: DISCONTINUED | OUTPATIENT
Start: 2021-09-20 | End: 2021-09-20 | Stop reason: HOSPADM

## 2021-09-20 RX ADMIN — Medication 10 MG: at 17:30

## 2021-09-20 RX ADMIN — ENOXAPARIN SODIUM 40 MG: 40 INJECTION SUBCUTANEOUS at 10:10

## 2021-09-20 RX ADMIN — SODIUM CHLORIDE, POTASSIUM CHLORIDE, SODIUM LACTATE AND CALCIUM CHLORIDE: 600; 310; 30; 20 INJECTION, SOLUTION INTRAVENOUS at 19:49

## 2021-09-20 RX ADMIN — REMIFENTANIL HYDROCHLORIDE 0.1 MCG/KG/MIN: 1 INJECTION, POWDER, LYOPHILIZED, FOR SOLUTION INTRAVENOUS at 17:16

## 2021-09-20 RX ADMIN — ACETAMINOPHEN 975 MG: 325 TABLET, FILM COATED ORAL at 23:40

## 2021-09-20 RX ADMIN — FENTANYL CITRATE 50 MCG: 50 INJECTION, SOLUTION INTRAMUSCULAR; INTRAVENOUS at 16:57

## 2021-09-20 RX ADMIN — Medication 5 MG: at 18:23

## 2021-09-20 RX ADMIN — SENNOSIDES AND DOCUSATE SODIUM 1 TABLET: 8.6; 5 TABLET ORAL at 23:41

## 2021-09-20 RX ADMIN — FENTANYL CITRATE 50 MCG: 50 INJECTION, SOLUTION INTRAMUSCULAR; INTRAVENOUS at 18:21

## 2021-09-20 RX ADMIN — Medication 10 MG: at 17:53

## 2021-09-20 RX ADMIN — CLINDAMYCIN PHOSPHATE 900 MG: 900 INJECTION, SOLUTION INTRAVENOUS at 16:32

## 2021-09-20 RX ADMIN — ASPIRIN 81 MG CHEWABLE TABLET 81 MG: 81 TABLET CHEWABLE at 08:00

## 2021-09-20 RX ADMIN — PHENYLEPHRINE HYDROCHLORIDE 0.3 MCG/KG/MIN: 10 INJECTION INTRAVENOUS at 17:13

## 2021-09-20 RX ADMIN — SODIUM CHLORIDE: 9 INJECTION, SOLUTION INTRAVENOUS at 23:46

## 2021-09-20 RX ADMIN — HEPARIN SODIUM 5000 UNITS: 1000 INJECTION INTRAVENOUS; SUBCUTANEOUS at 17:27

## 2021-09-20 RX ADMIN — KETOROLAC TROMETHAMINE 15 MG: 30 INJECTION, SOLUTION INTRAMUSCULAR at 18:26

## 2021-09-20 RX ADMIN — SODIUM CHLORIDE: 9 INJECTION, SOLUTION INTRAVENOUS at 08:01

## 2021-09-20 RX ADMIN — Medication 10 MG: at 18:48

## 2021-09-20 RX ADMIN — CLOPIDOGREL BISULFATE 75 MG: 75 TABLET ORAL at 08:00

## 2021-09-20 RX ADMIN — ROCURONIUM BROMIDE 10 MG: 10 INJECTION INTRAVENOUS at 17:37

## 2021-09-20 RX ADMIN — DEXAMETHASONE SODIUM PHOSPHATE 4 MG: 4 INJECTION, SOLUTION INTRA-ARTICULAR; INTRALESIONAL; INTRAMUSCULAR; INTRAVENOUS; SOFT TISSUE at 16:49

## 2021-09-20 RX ADMIN — ATORVASTATIN CALCIUM 40 MG: 40 TABLET, FILM COATED ORAL at 23:41

## 2021-09-20 RX ADMIN — SODIUM CHLORIDE, POTASSIUM CHLORIDE, SODIUM LACTATE AND CALCIUM CHLORIDE: 600; 310; 30; 20 INJECTION, SOLUTION INTRAVENOUS at 15:09

## 2021-09-20 RX ADMIN — PROPOFOL 200 MG: 10 INJECTION, EMULSION INTRAVENOUS at 16:40

## 2021-09-20 RX ADMIN — FENTANYL CITRATE 50 MCG: 50 INJECTION, SOLUTION INTRAMUSCULAR; INTRAVENOUS at 16:40

## 2021-09-20 RX ADMIN — HUMAN ALBUMIN MICROSPHERES AND PERFLUTREN 9 ML: 10; .22 INJECTION, SOLUTION INTRAVENOUS at 10:42

## 2021-09-20 RX ADMIN — ROCURONIUM BROMIDE 10 MG: 10 INJECTION INTRAVENOUS at 17:16

## 2021-09-20 RX ADMIN — Medication 10 MG: at 17:11

## 2021-09-20 RX ADMIN — ROCURONIUM BROMIDE 50 MG: 10 INJECTION INTRAVENOUS at 16:40

## 2021-09-20 RX ADMIN — GLYCOPYRROLATE 0.7 MG: 0.2 INJECTION, SOLUTION INTRAMUSCULAR; INTRAVENOUS at 19:19

## 2021-09-20 RX ADMIN — NEOSTIGMINE METHYLSULFATE 4.5 MG: 1 INJECTION, SOLUTION INTRAVENOUS at 19:19

## 2021-09-20 RX ADMIN — GLYCOPYRROLATE 0.1 MG: 0.2 INJECTION, SOLUTION INTRAMUSCULAR; INTRAVENOUS at 17:30

## 2021-09-20 RX ADMIN — ROCURONIUM BROMIDE 10 MG: 10 INJECTION INTRAVENOUS at 18:15

## 2021-09-20 RX ADMIN — ONDANSETRON 4 MG: 2 INJECTION INTRAMUSCULAR; INTRAVENOUS at 16:32

## 2021-09-20 RX ADMIN — LIDOCAINE HYDROCHLORIDE 100 MG: 20 INJECTION, SOLUTION INFILTRATION; PERINEURAL at 16:40

## 2021-09-20 ASSESSMENT — ACTIVITIES OF DAILY LIVING (ADL)
ADLS_ACUITY_SCORE: 4
ADLS_ACUITY_SCORE: 4
ADLS_ACUITY_SCORE: 6
ADLS_ACUITY_SCORE: 4

## 2021-09-20 ASSESSMENT — COPD QUESTIONNAIRES: COPD: 0

## 2021-09-20 ASSESSMENT — LIFESTYLE VARIABLES: TOBACCO_USE: 0

## 2021-09-20 NOTE — PROVIDER NOTIFICATION
Patient developed a hematoma at right groin sight around 2100. Manual pressure applied, call placed to Dr. Ceballos for further direction, orders received to apply manual for 10-15 minutes and restart flat bedrest for a minimum of 5-6 hours.

## 2021-09-20 NOTE — ANESTHESIA PROCEDURE NOTES
Arterial Line Procedure Note  Pre-Procedure   Staff -        Anesthesiologist:  Aki Aguilar MD       Performed By: Anesthesiologist       Location: pre-op       Pre-Anesthestic Checklist: patient identified, IV checked, risks and benefits discussed, informed consent, monitors and equipment checked and pre-op evaluation  Timeout:       Correct Patient: Yes        Correct Procedure: Yes        Correct Site: Yes        Correct Position: Yes   Procedure   Procedure: arterial line       Diagnosis: Hemodynamic instability       Laterality: right       Insertion Site: radial.  Sterile Prep        All elements of maximal sterile barrier technique followed       Skin prep: Chloraprep  Insertion/Injection        Catheter Type/Size: 20 gauge, 1.75 in/4.5 cm quick cath (integral wire)  Narrative         Secured by: other       Tegaderm dressing used.     Arterial waveform: Yes

## 2021-09-20 NOTE — PLAN OF CARE
Pt here with left parietal CVA. A&O. Neuros intact except for very slight right facial droop. VSS. Tele SB. NPO for cerebral angio but takes pills whole with thin liquids.  Cleared by PT/OT to be independent. Frontal headache, possibly from lack of caffeine per pt. Plan for CEA tomorrow at 1600 with Dr. Victor.

## 2021-09-20 NOTE — CONSULTS
Stroke Education Note    The following information has been reviewed with the patient and family:    1. Warning signs of stroke    2. Calling 911 if having warning signs of stroke    3. All modifiable risk factors: hypertension, CAD, atrial fib, diabetes, hypercholesterolemia, smoking, substance abuse, diet, physical inactivity, obesity, sleep apnea.    4. Patient's risk factors for stroke which include: HLD, unhealthy diet, physical inactivity    5. Follow-up plan for after discharge    6. Discharge medications which include: Lipitor, Aspirin and PLavix    In addition, the above information was given to the patient and family in writing as a part of the [facility] Stroke Class Handout.    Learner's response to risk factors / lifestyle modification education: Desire to change     JIMMY Garay RN

## 2021-09-20 NOTE — PLAN OF CARE
Patient admitted yesterday with left parietal cortex stroke. Cerebral angio today revealing 99% stenosis of left ICA, plan for carotid endart tomorrow with Dr. Victor (Per Dr. Victor this will be at 1600 and patient is ok to have an early breakfast and be NPO after 0800.). Patient is A&0x4, VSS on RA. CMS with baseline numbness in right foot. Patient arrived back from cerebral angio around 1645, vitally and neurologically stable all night, did develop a hematoma around 2100 which improved after manual pressure. Notified Dr. Ceballos who gave orders to apply 15 minutes of manual pressure and remain on flat bedrest for another 5-6 hours. Discharge plan pending.

## 2021-09-20 NOTE — CONSULTS
VASCULAR MEDICINE CHART CHECK    Patient is a 66 year old male who is undergoing a left carotid endarterectomy for symptomatic high grade stenosis. He does not smoke and is not diabetic. His lipid panel shows an LDL of 139, but he has been started on a statin already. He should have a lipid panel repeated in 3 months with a goal LDL of less than 70. No formal Vascular Medicine consultation will be undertaken today. The patient is already being managed medically by the Hospitalist service. Please call if we can be of any further assistance this admission or in the future ([phone]).

## 2021-09-20 NOTE — PLAN OF CARE
Pt here with acute ischemic stroke. A&Ox4. Neuros intact ex R foot baseline numbness. VSS on RA. Tele S Amador. Reg diet, thin liquids. Takes pills whole with water. On flat bedrest overnight. Hematoma present at R groin site, no redness, bruit or pain present. Denies pain. Pt scoring green on the Aggression Stop Light Tool. Plan for carotid endarterectomy. Discharge pending.

## 2021-09-20 NOTE — PROGRESS NOTES
Vascular Surgery Progress Note    S: Complains of some mild stomach discomfort through the night.  No neurological recurrent episodes.    O: Did have a hematoma developing in the right groin access site.  Pressure held.  Vitals:  BP  Min: 128/66  Max: 164/95  Temp  Av.3  F (36.8  C)  Min: 98.1  F (36.7  C)  Max: 98.6  F (37  C)  Pulse  Av  Min: 54  Max: 84  I/O last 3 completed shifts:  In: 1980 [P.O.:350; I.V.:1630]  Out: 350 [Urine:350]    Physical Exam: Alert and appropriate.  Comfortable.    Normal blood pressure.    Very mild soft hematoma right groin.  Good distal pulses.    Discussed angiographic findings with patient.      Assessment/Plan: Plan left CEA later this afternoon.  Aspirin/Plavix this morning.    One dose Lovenox yesterday and again this morning due to critical stenosis.    Patient no further questions about carotid surgery.      Wm. Kayleigh MD

## 2021-09-20 NOTE — ANESTHESIA PREPROCEDURE EVALUATION
Anesthesia Pre-Procedure Evaluation    Patient: Bernabe Grullon   MRN: [id number] : 1955        Preoperative Diagnosis: Acute ischemic stroke (H) [I63.9]   Procedure : Procedure(s):  ENDARTERECTOMY, CAROTID     Past Medical History:   Diagnosis Date     Carotid stenosis       History reviewed. No pertinent surgical history.   Allergies   Allergen Reactions     Penicillin G Other (See Comments)      Social History     Tobacco Use     Smoking status: Never Smoker     Smokeless tobacco: Never Used   Substance Use Topics     Alcohol use: Yes     Comment: 2 drinks daiy       Wt Readings from Last 1 Encounters:   21 86.5 kg (190 lb 11.2 oz)        Anesthesia Evaluation            ROS/MED HX  ENT/Pulmonary:    (-) tobacco use, asthma, COPD and sleep apnea   Neurologic:     (+) CVA, without deficits, - Initially had speech issues. .     Cardiovascular:     (+) Dyslipidemia -Peripheral Vascular Disease-- Carotid Stenosis. ---    METS/Exercise Tolerance: >4 METS    Hematologic:  - neg hematologic  ROS     Musculoskeletal:  - neg musculoskeletal ROS     GI/Hepatic:    (-) GERD   Renal/Genitourinary:  - neg Renal ROS     Endo:  - neg endo ROS     Psychiatric/Substance Use:       Infectious Disease:       Malignancy:  - neg malignancy ROS     Other:            Physical Exam    Airway        Mallampati: II   TM distance: > 3 FB   Neck ROM: full   Mouth opening: > 3 cm    Respiratory Devices and Support         Dental  no notable dental history       B=Bridge, C=Chipped, L=Loose, M=Missing    Cardiovascular   cardiovascular exam normal       Rhythm and rate: regular     Pulmonary   pulmonary exam normal        breath sounds clear to auscultation           OUTSIDE LABS:  CBC:   Lab Results   Component Value Date    WBC 6.9 2021    WBC 5.9 2021    HGB 15.7 2021    HGB 14.4 2021    HCT 45.1 2021    HCT 42.0 2021     2021     2021     BMP:   Lab Results    Component Value Date     09/19/2021     09/18/2021    POTASSIUM 3.8 09/19/2021    POTASSIUM 4.4 09/18/2021    CHLORIDE 109 09/19/2021    CHLORIDE 106 09/18/2021    CO2 23 09/19/2021    CO2 25 09/18/2021    BUN 15 09/19/2021    BUN 23 (H) 09/18/2021    CR 0.94 09/19/2021    CR 1.1 09/18/2021    GLC 98 09/19/2021     (H) 09/19/2021     COAGS:   Lab Results   Component Value Date    PTT 26 09/18/2021    INR 0.98 09/18/2021     POC: No results found for: BGM, HCG, HCGS  HEPATIC: No results found for: ALBUMIN, PROTTOTAL, ALT, AST, GGT, ALKPHOS, BILITOTAL, BILIDIRECT, TRAVIS  OTHER:   Lab Results   Component Value Date    A1C 5.3 09/19/2021    CHRIS 8.9 09/19/2021       Anesthesia Plan    ASA Status:  2      Anesthesia Type: General.     - Airway: ETT   Induction: Intravenous, Propofol.   Maintenance: Balanced.   Techniques and Equipment:     - Lines/Monitors: Arterial Line     Consents    Anesthesia Plan(s) and associated risks, benefits, and realistic alternatives discussed. Questions answered and patient/representative(s) expressed understanding.     - Discussed with:  Patient      - Extended Intubation/Ventilatory Support Discussed: No.      - Patient is DNR/DNI Status: No    Use of blood products discussed: No .     Postoperative Care    Pain management: Multi-modal analgesia.   PONV prophylaxis: Ondansetron (or other 5HT-3), Dexamethasone or Solumedrol     Comments:    Patient is counseled on the anesthesia plan and relevant anesthesia procedures (vascular lines/airway devices) including all risks and benefits. All patient questions were answered.               Aki Aguilar MD

## 2021-09-20 NOTE — PLAN OF CARE
Left parietal cortex stroke/left carotid stenosis.  Neuros/CMS - alert & oriented x 4, moving all extremities/strength good; denying numbness or tingling. No facial droop. No headache.  No dizziness or shortness of breath. VSS, room air. Tele SR with BBB. NPO since 08 breakfast tray/am pills provided whole with water. Up with 1 assist/gait belt. Continent of bladder/bm yesterday.  Denies pain. Pt scoring green on the Aggression Stop Light Tool. Awaiting surgery at 1600, patient agrees with plan.  Daughter and wife at bedside/supportive.  Coordinated stroke education via ipad this am. Right groin site intact/slight bruising/no change or tenderness with movement/covered.    Belongings - clothing/shoes & electric razor needs to be transferred to receiving room after procedure.

## 2021-09-20 NOTE — ANESTHESIA PROCEDURE NOTES
Airway       Patient location during procedure: OR       Procedure Start/Stop Times: 9/20/2021 4:42 PM  Staff -        CRNA: Liz Dennison APRN CRNA       Other Anesthesia Staff: Sirisha Agrawal       Performed By: SRNA and with CRNAs       Procedure performed by resident/fellow/CRNA in presence of a teaching physician.    Consent for Airway        Urgency: elective  Indications and Patient Condition       Indications for airway management: isaak-procedural       Induction type:intravenous       Mask difficulty assessment: 1 - vent by mask    Final Airway Details       Final airway type: endotracheal airway       Successful airway: ETT - single  Endotracheal Airway Details        ETT size (mm): 8.0       Cuffed: yes       Successful intubation technique: direct laryngoscopy       DL Blade Type: MAC 4       Grade View of Cords: 1       Adjucts: stylet       Position: Right       Measured from: gums/teeth       Secured at (cm): 23       Bite block used: None    Post intubation assessment        Placement verified by: capnometry, equal breath sounds and chest rise        Number of attempts at approach: 1       Number of other approaches attempted: 0       Secured with: pink tape       Ease of procedure: easy       Dentition: Intact and Unchanged

## 2021-09-20 NOTE — PROGRESS NOTES
[facility]    Medicine Progress Note - Hospitalist Service       Date of Admission:  9/18/2021    Assessment & Plan           Patient is a 66-year-old male with no previous past medical history, who presented with garbled speech and word finding difficulty, found to have a left parietal cortical stroke and left internal carotid artery stenosis.    Acute ischemic stroke  High grade left internal carotid artery stenosis  CT head no acute intracranial process.CTA neck- There is luminal low-attenuation within the proximal left internal carotid artery just distal to its origin favored reflect mural and/or intravascular thrombus within severe high-grade narrowing (greater than 95%) or less likely segmental occlusion. There is markedly diminished caliber of the left cervical ICA distal to this with improved vascular caliber of the cavernous and supraclinoid left ICA.  - MRI of the brain-Small acute to early subacute infarctions within the left parietal cortex. Abnormal signal within the left internal carotid artery consistent with high-grade stenosis demonstrated on previous CTA.    - S/p ASA and plavix load in the ED  - stroke neurology consulted, continuing with ASA and Plavix. Statin added. .   - defer to neurology regarding Plavix  - HbA1c 5.3, lipid panel noted . Troponin negative.   - TTE pending.    - Vascular surgery consulted-->   - Cerebral angiogram     - plan for left CEA today  - SLP, PT/OT consult  - Continue neuro checks, telemetery  - permissive HTN, will need strict BP control post CEA, will have PRN meds once parameters set post op.     Hyperlipidemia  On statin now as above    Knee pain  Takes Ibuprofen, now that he is on DAPT, advised tylenol instead to minimize risk of bleeding.        Diet: Regular Diet Adult    DVT Prophylaxis: Pneumatic Compression Devices  Clayton Catheter: Not present  Central Lines: None  Code Status: Full Code      Disposition Plan   Expected  discharge: 1-2 days recommended to TBD once CEA and post op course.     The patient's care was discussed with the Bedside Nurse and Patient.    Sultana Veliz MD  Hospitalist Service  [facility]  Securely message with the Vocera Web Console (learn more here)  Text page via [employer] [employer] [facility] Paging/Directory      Clinically Significant Risk Factors Present on Admission                ______________________________________________________________________    Interval History   Reports mild numbness on Lt lower face. Feels speech is normal.    Denies visual disturbance, focal weakness, chest pain, dyspnea, nausea or vomiting.     Data reviewed today: I reviewed all medications, new labs and imaging results over the last 24 hours. I personally reviewed no images. EKG's/telemetry showed sinus bradycardia.    Physical Exam   Vital Signs: Temp: 98.1  F (36.7  C) Temp src: Oral BP: (!) 167/96 Pulse: 56   Resp: 16 SpO2: 94 % O2 Device: None (Room air)    Weight: 190 lbs 11.2 oz     General Appearance: Alert,awake and oriented x3, very pleasant, comfortable.   HEENT: PERRLA  Respiratory: clear to auscultation bilaterally, no wheezing  Cardiovascular: s1s2 regular, bradycardia  GI: soft and non-tender  Skin: warm and dry  Neuro:AAOx3, CN 2-12 intact, strength symmetrical    Data   Recent Labs   Lab 09/19/21  1701 09/19/21  1212 09/19/21  0829 09/18/21  2234 09/18/21  1225 09/18/21  1222   0000   WBC  --   --  6.9  --   --  5.9  --    HGB  --   --  15.7  --   --  14.4  --    MCV  --   --  89  --   --  91  --    PLT  --   --  266  --   --  246  --    INR  --   --   --   --   --  0.98  --    NA  --   --  141  --   --  138  --    POTASSIUM  --   --  3.8  --   --  4.4  --    CHLORIDE  --   --  109  --   --  106  --    CO2  --   --  23  --   --  25  --    BUN  --   --  15  --   --  23*  --    CR  --   --  0.94  --  1.1 1.10  --    ANIONGAP  --   --  9  --   --  7  --    CHRIS  --   --  8.9  --   --  9.0  --    GLC 98  102* 117*   < >  --  116   < >    < > = values in this interval not displayed.     No results found for this or any previous visit (from the past 24 hour(s)).  Medications     - MEDICATION INSTRUCTIONS -       - MEDICATION INSTRUCTIONS -       sodium chloride 75 mL/hr at 09/20/21 0801       aspirin  81 mg Oral Daily    Or     aspirin  81 mg Oral or NG Tube Daily     atorvastatin  40 mg Oral or Feeding Tube QPM     clopidogrel  75 mg Oral Daily     enoxaparin ANTICOAGULANT  40 mg Subcutaneous BID     sodium chloride (PF)  3 mL Intracatheter Q8H

## 2021-09-20 NOTE — PROGRESS NOTES
"Brief Stroke Note:     DSA completed yesterday shows 99% L ICA stenosis. Plan for L CEA today. We will re-evaluate patient on Tuesday.    ANJANA Walsh, CNP  Neurology  09/20/2021 3:19 PM  To page stroke neurology after hours or on a subsequent day, click here: [facility]  Choose \"On Call\" tab at top, then search dropdown box for \"Neurology Adult\" & press Enter, look for Neuro ICU/Stroke       "

## 2021-09-20 NOTE — PROGRESS NOTES
Paged by RN as pt requesting tylenol PM. Recommend melatonin instead. Prefer against benadryl or any other sedating medications given presentation of stroke like sx.

## 2021-09-21 ENCOUNTER — APPOINTMENT (OUTPATIENT)
Dept: SPEECH THERAPY | Facility: CLINIC | Age: 66
DRG: 039 | End: 2021-09-21
Attending: INTERNAL MEDICINE
Payer: COMMERCIAL

## 2021-09-21 ENCOUNTER — TELEPHONE (OUTPATIENT)
Dept: FAMILY MEDICINE | Facility: CLINIC | Age: 66
End: 2021-09-21

## 2021-09-21 VITALS
WEIGHT: 191.58 LBS | DIASTOLIC BLOOD PRESSURE: 86 MMHG | BODY MASS INDEX: 25.28 KG/M2 | OXYGEN SATURATION: 96 % | RESPIRATION RATE: 16 BRPM | SYSTOLIC BLOOD PRESSURE: 126 MMHG | HEART RATE: 54 BPM | TEMPERATURE: 98 F

## 2021-09-21 LAB
ANION GAP SERPL CALCULATED.3IONS-SCNC: 6 MMOL/L (ref 3–14)
BUN SERPL-MCNC: 17 MG/DL (ref 7–30)
CALCIUM SERPL-MCNC: 8.4 MG/DL (ref 8.5–10.1)
CHLORIDE BLD-SCNC: 108 MMOL/L (ref 94–109)
CO2 SERPL-SCNC: 23 MMOL/L (ref 20–32)
CREAT SERPL-MCNC: 0.97 MG/DL (ref 0.66–1.25)
GFR SERPL CREATININE-BSD FRML MDRD: 80 ML/MIN/1.73M2
GLUCOSE BLD-MCNC: 109 MG/DL (ref 70–99)
HCT VFR BLD AUTO: 40.8 % (ref 40–53)
HGB BLD-MCNC: 13.7 G/DL (ref 13.3–17.7)
POTASSIUM BLD-SCNC: 3.9 MMOL/L (ref 3.4–5.3)
SODIUM SERPL-SCNC: 137 MMOL/L (ref 133–144)

## 2021-09-21 PROCEDURE — 250N000013 HC RX MED GY IP 250 OP 250 PS 637: Performed by: STUDENT IN AN ORGANIZED HEALTH CARE EDUCATION/TRAINING PROGRAM

## 2021-09-21 PROCEDURE — 85018 HEMOGLOBIN: CPT | Performed by: STUDENT IN AN ORGANIZED HEALTH CARE EDUCATION/TRAINING PROGRAM

## 2021-09-21 PROCEDURE — 99239 HOSP IP/OBS DSCHRG MGMT >30: CPT | Performed by: HOSPITALIST

## 2021-09-21 PROCEDURE — 99232 SBSQ HOSP IP/OBS MODERATE 35: CPT | Mod: GC | Performed by: PSYCHIATRY & NEUROLOGY

## 2021-09-21 PROCEDURE — 36415 COLL VENOUS BLD VENIPUNCTURE: CPT | Performed by: STUDENT IN AN ORGANIZED HEALTH CARE EDUCATION/TRAINING PROGRAM

## 2021-09-21 PROCEDURE — 80048 BASIC METABOLIC PNL TOTAL CA: CPT | Performed by: STUDENT IN AN ORGANIZED HEALTH CARE EDUCATION/TRAINING PROGRAM

## 2021-09-21 PROCEDURE — 92610 EVALUATE SWALLOWING FUNCTION: CPT | Mod: GN | Performed by: SPEECH-LANGUAGE PATHOLOGIST

## 2021-09-21 RX ORDER — ACETAMINOPHEN 325 MG/1
650 TABLET ORAL EVERY 4 HOURS PRN
COMMUNITY
Start: 2021-09-21 | End: 2021-12-16

## 2021-09-21 RX ORDER — ATORVASTATIN CALCIUM 40 MG/1
80 TABLET, FILM COATED ORAL EVERY EVENING
Status: DISCONTINUED | OUTPATIENT
Start: 2021-09-21 | End: 2021-09-21 | Stop reason: HOSPADM

## 2021-09-21 RX ORDER — CLOPIDOGREL BISULFATE 75 MG/1
75 TABLET ORAL DAILY
Qty: 30 TABLET | Refills: 0 | Status: SHIPPED | OUTPATIENT
Start: 2021-09-21 | End: 2021-12-16

## 2021-09-21 RX ORDER — ATORVASTATIN CALCIUM 40 MG/1
40 TABLET, FILM COATED ORAL EVERY EVENING
Qty: 30 TABLET | Refills: 4 | Status: SHIPPED | OUTPATIENT
Start: 2021-09-21 | End: 2021-09-21

## 2021-09-21 RX ORDER — ATORVASTATIN CALCIUM 80 MG/1
80 TABLET, FILM COATED ORAL EVERY EVENING
Qty: 30 TABLET | Refills: 4 | Status: SHIPPED | OUTPATIENT
Start: 2021-09-21

## 2021-09-21 RX ADMIN — CLOPIDOGREL BISULFATE 75 MG: 75 TABLET ORAL at 08:24

## 2021-09-21 RX ADMIN — POLYETHYLENE GLYCOL 3350 17 G: 17 POWDER, FOR SOLUTION ORAL at 08:24

## 2021-09-21 RX ADMIN — ACETAMINOPHEN 975 MG: 325 TABLET, FILM COATED ORAL at 06:09

## 2021-09-21 RX ADMIN — OXYCODONE HYDROCHLORIDE 5 MG: 5 TABLET ORAL at 04:05

## 2021-09-21 RX ADMIN — ASPIRIN 81 MG CHEWABLE TABLET 81 MG: 81 TABLET CHEWABLE at 08:24

## 2021-09-21 RX ADMIN — SENNOSIDES AND DOCUSATE SODIUM 1 TABLET: 8.6; 5 TABLET ORAL at 08:24

## 2021-09-21 ASSESSMENT — ACTIVITIES OF DAILY LIVING (ADL)
ADLS_ACUITY_SCORE: 6

## 2021-09-21 NOTE — DISCHARGE SUMMARY
[facility]    Discharge Summary  Hospitalist    Date of Admission:  9/18/2021  Date of Discharge:  9/21/2021  Discharging Provider: Loni Delgado DO  Date of Service (when I saw the patient): 09/21/21    Discharge Diagnoses   Acute ischemic stroke  High grade left internal carotid artery stenosis s/p CEA 9/20  Hyperlipidemia   Knee pain    History of Present Illness   Bernabe Grullon is an 66 year old male who presented with garbled speech and word finding difficulty, found to have a left parietal cortical stroke and left internal carotid artery stenosis. Underwent left carotid endarterectomy on 9/20/21    Hospital Course   Bernabe Grullon was admitted on 9/18/2021.  The following problems were addressed during his hospitalization:    Acute ischemic stroke  High grade left internal carotid artery stenosis s/p carotid endarterectomy on 9/20  *CT head no acute intracranial process.CTA neck- There is luminal low-attenuation within the proximal left internal carotid artery just distal to its origin favored reflect mural and/or intravascular thrombus within severe high-grade narrowing (greater than 95%) or less likely segmental occlusion. There is markedly diminished caliber of the left cervical ICA distal to this with improved vascular caliber of the cavernous and supraclinoid left ICA.   *MRI of the brain-Small acute to early subacute infarctions within the left parietal cortex. Abnormal signal within the left internal carotid artery consistent with high-grade stenosis demonstrated on previous CTA.    *HbA1c 5.3, lipid panel noted . Troponin negative.   *9/20 Echo: LVEF 60%, RV normal. Mild aortic regurgitation  - stroke neurology consulted, continuing with ASA 81mg daily indefinitely and Plavix 75mg daily x1mo.   - atorvastatin 80mg daily added  - Vascular surgery consulted, underwent cerebral angiogram and then had left carotid endarterectomy on 9/20.  - SLP, PT/OT evaluated, he is at  his baseline and has no needs on discharge  - follow up with PCP within one week for hospital follow-up  - follow up with vascular surgery in 2 weeks (via video) and then in 3mo with repeat left carotid duplex at that time     Hyperlipidemia  - atorvastatin 80mg daily added  - recheck Lipids in 3mo     Knee pain  Takes Ibuprofen, now that he is on DAPT, advised tylenol instead to minimize risk of bleeding.     Loni Delgado, DO    Significant Results and Procedures   9/20: Cerebral angiogram and left carotid endarterectomy    Pending Results   NA    Code Status   Full Code       Primary Care Physician   KIERA CASTELAN    Physical Exam   Temp: 98  F (36.7  C) Temp src: Oral BP: 126/86 Pulse: 54   Resp: 16 SpO2: 96 % O2 Device: None (Room air) Oxygen Delivery: 2 LPM  Vitals:    09/20/21 0546 09/21/21 0631   Weight: 86.5 kg (190 lb 11.2 oz) 86.9 kg (191 lb 9.3 oz)     Vital Signs with Ranges  Temp:  [97.9  F (36.6  C)-98.2  F (36.8  C)] 98  F (36.7  C)  Pulse:  [] 54  Resp:  [8-23] 16  BP: (113-159)/(74-98) 126/86  MAP:  [105 mmHg-114 mmHg] 109 mmHg  Arterial Line BP: (141-156)/(79-86) 149/80  SpO2:  [90 %-99 %] 96 %  I/O last 3 completed shifts:  In: 330 [P.O.:330]  Out: 1325 [Urine:1300; Blood:25]    Patient seen and examined on day of discharge. Wife and daughter are at bedside. He is doing well. Tolerating diet. Pain controlled with tylenol. Speech back to normal. Discussed medications and limitations on discharge. Stable for discharge to home. No driving for 1-2 weeks.    Constitutional: Awake, alert, cooperative, no apparent distress.  Eyes: Conjunctiva and pupils examined and normal.  HEENT: Moist mucous membranes, normal dentition. Left neck with ecchymosis, steri strips intact. Some soft tissue swelling down toward collar bone. Able to turn neck.  Respiratory: Clear to auscultation bilaterally, no crackles or wheezing.  Cardiovascular: Regular rate and rhythm, normal S1 and S2, and no murmur  noted.  GI: Soft, non-distended, non-tender, normal bowel sounds.  Lymph/Hematologic: No anterior cervical or supraclavicular adenopathy.  Skin: No rashes, no cyanosis, no edema.  Musculoskeletal: No joint swelling, erythema or tenderness.  Neurologic: Cranial nerves 2-12 intact, normal strength and sensation.  Psychiatric: Alert, oriented to person, place and time, no obvious anxiety or depression.    Discharge Disposition   Discharged to home  Condition at discharge: Stable    Consultations This Hospital Stay   PATIENT LEARNING CENTER IP CONSULT  NEUROLOGY IP CONSULT  SPEECH LANGUAGE PATH ADULT IP CONSULT  PHARMACY IP CONSULT  PHARMACY IP CONSULT  PHARMACY IP CONSULT  PHYSICAL THERAPY ADULT IP CONSULT  OCCUPATIONAL THERAPY ADULT IP CONSULT  REHAB ADMISSIONS LIAISON IP CONSULT  CARE MANAGEMENT / SOCIAL WORK IP CONSULT  VASCULAR SURGERY IP CONSULT  SMOKING CESSATION PROGRAM IP CONSULT  SMOKING CESSATION PROGRAM IP CONSULT    Time Spent on this Encounter   ILoni DO, personally saw the patient today and spent greater than 30 minutes discharging this patient.    Discharge Orders      Reason for your hospital stay    Stroke secondary to carotid stenosis     Follow-up and recommended labs and tests     Please call [phone] to setup a followup appointment with Dr. Victor (Vascular Surgery) in 2-4 weeks, unless previously scheduled.    [facility] [place] [address]  T: [phone]     Activity    Gradually resume your normal activity level prior to your hospital visit. No driving for 1-2 weeks until your neck stiffness/discomfort has subsided and you have full neck range of motion     Wound care and dressings    Steri strips applied to wound to protect healing incision. Do not pull off, they will fall off on their own. Okay to trim.  You can get those incisions wet, but avoid soaking/submerging them for the next 3 weeks to allow proper healing. If  at the incision site you start having new/different discharge/drainage, foul smell, or redness and warmth to the touch, you should call the vascular surgery office. Also call the office if you have a fever of 100.5F or greater.     Follow-up and recommended labs and tests     Follow up with me,  Puma Victor, within 2 weeks via Video. to evaluate after surgery.  The following labs/tests are recommended: Left Carotid Duplex in 3 months.     Follow-up and recommended labs and tests     Follow up with primary care provider, KIERA CASTELAN, within 7 days for hospital follow- up.  No follow up labs or test are needed.     Diet    Follow this diet upon discharge: Advance to a regular diet as tolerated     Discharge Medications   Current Discharge Medication List      START taking these medications    Details   acetaminophen (TYLENOL) 325 MG tablet Take 2 tablets (650 mg) by mouth every 4 hours as needed for mild pain    Associated Diagnoses: Acute ischemic stroke (H)      atorvastatin (LIPITOR) 80 MG tablet 1 tablet (80 mg) by Oral or Feeding Tube route every evening  Qty: 30 tablet, Refills: 4    Comments: Future refills by PCP Dr. KIERA CASTELAN with phone number [phone].  Associated Diagnoses: Acute ischemic stroke (H)      clopidogrel (PLAVIX) 75 MG tablet Take 1 tablet (75 mg) by mouth daily  Qty: 30 tablet, Refills: 0    Associated Diagnoses: Acute ischemic stroke (H)         CONTINUE these medications which have NOT CHANGED    Details   Ascorbic Acid 500 MG CHEW Take 500 mg by mouth daily       aspirin (ASA) 81 MG EC tablet Take 81 mg by mouth daily          STOP taking these medications       ibuprofen (ADVIL/MOTRIN) 200 MG tablet Comments:   Reason for Stopping:             Allergies   Allergies   Allergen Reactions     Penicillin G Other (See Comments)     Data   Most Recent 3 CBC's:  Recent Labs   Lab Test 09/21/21  0717 09/19/21  0829 09/18/21  1222   WBC  --  6.9 5.9   HGB 13.7 15.7 14.4   MCV   --  89 91   PLT  --  266 246      Most Recent 3 BMP's:  Recent Labs   Lab Test 21  0717 21  1701 21  1212 21  0829 21  0754 21  2234 21  1225 21  1222   0000     --   --  141  --   --   --  138  --    POTASSIUM 3.9  --   --  3.8  --   --   --  4.4  --    CHLORIDE 108  --   --  109  --   --   --  106  --    CO2 23  --   --  23  --   --   --  25  --    BUN 17  --   --  15  --   --   --  23*  --    CR 0.97  --   --  0.94  --   --  1.1 1.10  --    ANIONGAP 6  --   --  9  --   --   --  7  --    CHRIS 8.4*  --   --  8.9  --   --   --  9.0  --    * 98 102* 117*   < >   < >  --  116   < >    < > = values in this interval not displayed.     Most Recent 2 LFT's:No lab results found.  Most Recent INR's and Anticoagulation Dosing History:  Anticoagulation Dose History     Recent Dosing and Labs Latest Ref Rng & Units 2021    INR 0.85 - 1.15 0.98        Most Recent 3 Troponin's:No lab results found.  Most Recent Cholesterol Panel:  Recent Labs   Lab Test 21  0829   CHOL 231*   *   HDL 54   TRIG 188*     Most Recent 6 Bacteria Isolates From Any Culture (See EPIC Reports for Culture Details):No lab results found.  Most Recent TSH, T4 and A1c Labs:  Recent Labs   Lab Test 21  0829   A1C 5.3     Results for orders placed or performed during the hospital encounter of 21   Echocardiogram Complete - For age > 60 yrs     Value    LVEF  60%    Narrative    [id number]  [id number]  [id number]  [id number]^JULIAN^JOEL^JENNIFER     [facility]  Echocardiography Laboratory  [address] [place] [address]     Name: BRANDON ROMEO  MRN: [id number]  : 1955  Study Date: 2021 10:14 AM  Age: 66 yrs  Gender: Male  Patient Location: [facility]  Reason For Study: Cerebrovascular Incident  Ordering Physician: JOEL JORDAN  Referring Physician: Tate Tafoya  Performed By: Liana Gandhi     BSA: 2.1 m2  Height: 73 in  Weight: 190 lb  HR:  61  BP: 167/96 mmHg  ______________________________________________________________________________  Procedure  Complete Portable Echo Adult. Optison (NDC #[id number]) given intravenously.  ______________________________________________________________________________  Interpretation Summary     1. The left ventricle is normal in structure, function and size. The visual  ejection fraction is estimated at 60%.  2. The right ventricle is normal in structure, function and size.  3. There is mild (1+) aortic regurgitation.     No previous echo for comparison.  ______________________________________________________________________________  Left Ventricle  The left ventricle is normal in structure, function and size. There is normal  left ventricular wall thickness. The visual ejection fraction is estimated at  60%. Left ventricular diastolic function is normal. Normal left ventricular  wall motion.     Right Ventricle  The right ventricle is normal in structure, function and size.     Atria  Normal left atrial size. Right atrial size is normal. There is no atrial shunt  seen.     Mitral Valve  The mitral valve is normal in structure and function.     Tricuspid Valve  There is trace to mild tricuspid regurgitation. The right ventricular systolic  pressure is approximated at 17.2 mmHg plus the right atrial pressure.     Aortic Valve  There is mild (1+) aortic regurgitation.     Pulmonic Valve  The pulmonic valve is normal in structure and function.     Vessels  Normal ascending, transverse (arch), and descending aorta. The inferior vena  cava was normal in size with preserved respiratory variability.     Pericardium  There is no pericardial effusion.     Rhythm  Sinus rhythm was noted.  ______________________________________________________________________________  MMode/2D Measurements & Calculations  IVSd: 1.1 cm     LVIDd: 4.2 cm  LVIDs: 2.6 cm  LVPWd: 1.1 cm  FS: 38.3 %  LV mass(C)d: 159.6 grams  LV mass(C)dI: 75.8  grams/m2  Ao root diam: 3.4 cm  LA dimension: 3.3 cm  asc Aorta Diam: 3.2 cm  LA/Ao: 0.97  LA Volume (BP): 44.4 ml  LA Volume Index (BP): 21.0 ml/m2  RWT: 0.52  TAPSE: 2.9 cm     Doppler Measurements & Calculations  MV E max misha: 55.6 cm/sec  MV A max misha: 77.1 cm/sec  MV E/A: 0.72  MV dec time: 0.27 sec  AI P1/2t: 891.3 msec  TR max misha: 207.6 cm/sec  TR max P.2 mmHg  E/E' av.4  Lateral E/e': 5.7  Medial E/e': 9.1     ______________________________________________________________________________  Report approved by: Stephanie Pierre 2021 12:34 PM

## 2021-09-21 NOTE — OP NOTE
Procedure Date: 09/20/2021    PREOPERATIVE DIAGNOSIS:  Critical symptomatic left internal carotid artery stenosis.    POSTOPERATIVE DIAGNOSIS:  Critical symptomatic left internal carotid artery stenosis.    OPERATIVE PROCEDURE:  Left carotid endarterectomy with external jugular vein patch angioplasty.    a. Intraoperative shunting.  b. EEG monitoring.    OPERATING SURGEON:  Puma Victor MD    FIRST ASSISTANT:  Starr Brown MD (Vascular Fellow).    ANESTHESIA:  General.    PREOPERATIVE MEDICATIONS:  Cleocin 900 mg IV.    INDICATIONS FOR PROCEDURE:  A 66-year-old patient with overall excellent health suffered a left hemispheric cerebral event.  This was documented by MRI with excellent neurological recovery.  CTA revealed a very critical stenosis of the proximal left ICA with minimal contralateral disease.  There was a question whether the artery was actually occluded, and a formal cerebral angiogram was performed yesterday, revealing a 99% stenosis of the ICA 1 cm from its origin.  The distal ICA was still patent and otherwise unremarkable.  No intracerebral disease was noted.  The patient has been on aspirin/Plavix along with Lovenox.  He comes to the operating room today under informed consent.  Neurologically normal.    DESCRIPTION OF PROCEDURE:  The patient was brought to the operating room, induced under general anesthesia without difficulty.  Blood pressure was in the range of 170 systolic in preinduction, and this was monitored via an arterial line.  He had an excellent external jugular vein easily visualized on his neck, which was marked.  Rolled towel was placed under the knees, and calf pneumatic compression boots were used.  Pillows were placed under shoulders.  Left neck area was prepped and draped.  Timeout was called, and the sites were identified.    VASCULAR EXPOSURE:  Standard left carotid incision was made.  Dissection was carried to the platysmas.  He had a very minimal mandibular cutaneous  nerve that was not preserved.  We dissected free an excellent external jugular vein and mobilized this proximally and distally, ligating 1 side branch between 7-0 Prolene sutures and preserving flow.    Sternocleidomastoid muscle was retracted laterally.  We dissected down to the carotid sheath, which was opened.  We visualized the ansa cervicalis/hypoglossals/vagus nerves with minimal manipulation.  The patient had a normal level of the bifurcation, but the plaque did extend up the ICA for several centimeters.  Common carotid artery was diffusely thickened but very soft, and this was encircled.  Minimal disease within the external carotid artery, and this was also encircled.  We dissected free the distal ICA beyond the plaque, and this was encircled with Silastic vessel loop.    CAROTID ENTERECTOMY:  5000 units intravenous heparin given.  Vessel loops were tightened after 5 minutes with a stable EEG.  An 11 blade was used to enter the common carotid.  Gibson scissors extended the arteriotomy into the distal ICA.  We found an ulcerative, very friable, soft plaque noted, very consistent with his symptoms.  Distal ICA was free of disease.  A preheparinized Sundt shunt was inserted and secured with a vessel loop proximally and Darion clamp distally with a stable EEG.    ENDARTERECTOMY:  Distal endpoint in the ICA was made with a Braymer blade and loupe magnification with a nice smooth transition being noted, and this was tacked down with several interrupted 7-0 Prolene sutures.  Endarterectomy was performed on the deep media with a spatula.  CC endpoint measured less than a millimeter in thickness and was soft.  A good eversion endarterectomy was performed of the external carotid artery with a fairly good endpoint and excellent backbleeding.  All loose medial fibers were removed down to a smooth plane.  There was one area in the common carotid artery just before the external carotid artery that was somewhat thinner but  still very adequate.    EXTERNAL JUGULAR VEIN PATCH:  We then harvested the external jugular vein from the neck.  Gently dilated with 0.5% Marcaine.  A side branch was oversewn with a 7-0 Prolene suture.  The vein was everted and left double thickness in normal direction of flow.  This was sewn to the arteriotomy with running 6-0 Prolene suture and loupe magnification.  Prior to complete closure, the shunt was removed, and the vessel was allowed to backbleed, and antegrade bleeding from the common carotid artery.  Flushed with heparinized saline solution.  The arteriotomy closure was completed.  Blood flow sequentially allowed to go up the ICA.  We did place a single 7-0 Prolene suture in the distal endpoint for good hemostasis.  Gauze was held over the sites until we had a very dry field.  Patch measured 6 cm in length with a maximum width of 0.5 cm.  Small amount of Surgicel was placed over this.  Neck was closed in layers with interrupted running 3-0 Vicryl suture.  Very dry field was noted.  Skin was closed with 4-0 Monocryl in subcuticular fashion after infiltrating the wound with 0.5% Marcaine for postop analgesia and Toradol 15 mg IV.    Blood pressure was well controlled upon completion of the procedure, with a very stable EEG.  Steri-Strips, gauze dressing applied.    The patient was awoken the operating table, was extubated and returned to the recovery room in satisfactory condition.  Needle and sponge count correct x 2.    COMPLICATIONS:  None.    ESTIMATED BLOOD LOSS:  Less than 25 mL.    FINDINGS:  Critical, nearly occlusive stenosis of the left proximal ICA with very friable plaque.    Puma Victor MD        D: 2021   T: 2021   MT: [facility]    Name:     BRANDON ROMEO  MRN:      [id number]        Account:        [id number]   :      1955           Procedure Date: 2021     Document: [id number]    cc:  Puma Victor MD

## 2021-09-21 NOTE — PLAN OF CARE
A&O x4, VSS on RA. Pt up SBA. Tolerating clear liquids. Voiding adequately. Neuros intact. CMS intact. Pain controlled with scheduled tylenol. Groin site WDL. L carotid site marked and WDL. Tele Sinus mai.

## 2021-09-21 NOTE — ANESTHESIA POSTPROCEDURE EVALUATION
Patient: Bernabe Grullon    Procedure(s):  CAROTID ENDARTERECTOMY WITH EXTERNAL JUGULAR VEIN PATCH    Diagnosis:Acute ischemic stroke (H) [I63.9]  Diagnosis Additional Information: No value filed.    Anesthesia Type:  General    Note:  Disposition: Inpatient   Postop Pain Control: Uneventful            Sign Out: Well controlled pain   PONV: No   Neuro/Psych: Uneventful            Sign Out: Acceptable/Baseline neuro status   Airway/Respiratory: Uneventful            Sign Out: Acceptable/Baseline resp. status   CV/Hemodynamics: Uneventful            Sign Out: Acceptable CV status; No obvious hypovolemia; No obvious fluid overload   Other NRE: NONE   DID A NON-ROUTINE EVENT OCCUR? No           Last vitals:  Vitals Value Taken Time   /93 09/20/21 2030   Temp 36.8  C (98.2  F) 09/20/21 1932   Pulse 75 09/20/21 2040   Resp 18 09/20/21 2040   SpO2 95 % 09/20/21 2040   Vitals shown include unvalidated device data.    Electronically Signed By: Abhinav Major MD  September 20, 2021  8:42 PM

## 2021-09-21 NOTE — TELEPHONE ENCOUNTER
Reason for Call:  Other appointment    Detailed comments: New patient but INF appointment is needed. Bernabe was in [facility] for Stroke this weekend and is needing to do INF within 4 days.    Phone Number Patient can be reached at: Home number on file [phone] (home)    Best Time: any    Can we leave a detailed message on this number? YES    Call taken on 9/21/2021 at 10:39 AM by Trudi Jarquin

## 2021-09-21 NOTE — ANESTHESIA CARE TRANSFER NOTE
Patient: Bernabe Grullon    Procedure(s):  CAROTID ENDARTERECTOMY WITH EXTERNAL JUGULAR VEIN PATCH    Diagnosis: Acute ischemic stroke (H) [I63.9]  Diagnosis Additional Information: No value filed.    Anesthesia Type:   General     Note:    Oropharynx: spontaneously breathing  Level of Consciousness: awake  Oxygen Supplementation: face mask    Independent Airway: airway patency satisfactory and stable  Dentition: dentition unchanged  Vital Signs Stable: post-procedure vital signs reviewed and stable  Report to RN Given: handoff report given  Patient transferred to: PACU    Handoff Report: Identifed the Patient, Identified the Reponsible Provider, Reviewed the pertinent medical history, Discussed the surgical course, Reviewed Intra-OP anesthesia mangement and issues during anesthesia, Set expectations for post-procedure period and Allowed opportunity for questions and acknowledgement of understanding      Vitals:  Vitals Value Taken Time   /95 09/20/21 2040   Temp 36.8  C (98.2  F) 09/20/21 1932   Pulse 75 09/20/21 2042   Resp 13 09/20/21 2042   SpO2 95 % 09/20/21 2042   Vitals shown include unvalidated device data.    Electronically Signed By: Abhinav Major MD  September 20, 2021  8:42 PM

## 2021-09-21 NOTE — PROGRESS NOTES
Vascular Surgery Progress Note:  POD #1  Left CEA    S: No complaints at all.  Very comfortable tonight.  Minimal if any pain.  Tolerating liquids.    O: Normotensive blood pressure presently.  Somewhat high at preinduction  Vitals:  BP  Min: 113/76  Max: 167/96  Temp  Av.1  F (36.7  C)  Min: 97.9  F (36.6  C)  Max: 98.2  F (36.8  C)  Pulse  Av.9  Min: 54  Max: 113  I/O last 3 completed shifts:  In: 330 [P.O.:330]  Out: 1325 [Urine:1300; Blood:25]    Physical Exam: Sitting up.  Alert and appropriate.  Very comfortable.    Chest= clear    Wd=A   CN XII and Neuro=A      Normal preoperative echocardiogram.    Assessment/Plan: #1.  Doing very well following left CEA for symptomatic critical ulcerated left carotid stenosis.  Plavix for 1 month.  Aspirin indefinitely.  Home today.  Video follow-up in 2 weeks with left carotid duplex in office in 3 months.       #2.  Aware the importance of good risk factor control.  Has been normotensive with no medications.  Elevated LDL and initiated on statin.  Discussed the importance of this.=139      Wm. MD Kayleigh

## 2021-09-21 NOTE — BRIEF OP NOTE
[facility]    Brief Operative Note    Pre-operative diagnosis: Acute ischemic stroke (H) [I63.9]  Post-operative diagnosis Same as pre-operative diagnosis    Procedure: Procedure(s):  CAROTID ENDARTERECTOMY WITH EXTERNAL JUGULAR VEIN PATCH  Surgeon: Surgeon(s) and Role:     * Puma Victor - Primary     * Starr Brown MD - Fellow - Assisting  Anesthesia: General   Estimated blood loss: 20cc  Drains: None  Specimens: * No specimens in log *  Findings:   Ulcerated plaque .  Complications: None.  Implants: * No implants in log *

## 2021-09-21 NOTE — PROGRESS NOTES
"      [facility]    Stroke Progress Note    Interval EventsL CEA completed yesterday, no new focal neurologic deficits today.     HPI Summary  66 M who presented to OSH with transient word finding difficulty. Found to have a >95% L ICA stenosis and small L parietal infarcts. Transferred to [facility] for L CEA. DSA showed trickle flow and L CEA completed 9/20.     Stroke Evaluation Summarized     MRI/Head CT MRI ann marie with acute to early subacute left parietal infarcts   Intracranial Vasculature CTA head unremarkable   Cervical Vasculature CTA neck with >95% narrowing with possible segmental occlusion of the L ICA      Echocardiogram EF 60%, normal LA, no atrial shunt   EKG/Telemetry Sinus mai with 1st degree AVB   Other Testing Not Applicable      LDL  9/19/2021: 139 mg/dL   A1C  9/19/2021: 5.3 %   Troponin No lab value available in past 48 hrs      Impression   Acute ischemic stroke of left parietal lobe due to large-artery atherosclerosis - symptomatic L ICA stenosis, now s/p L CEA    Plan  - Continue DAPT with ASA 81 mg + Plavix 75 mg for one month then ASA alone indefinitely   - , continue Lipitor 80 mg daily with goal LDL 40-70  - Goal BP <130/80 with tighter control associated with decreased overall CV risk, if tolerated. Discussed that he should acquire a BP cuff and take BP twice daily, keep log for PCP.  - Therapies, as needed    Patient Follow-up    - in the next 1-2 week(s) with PCP   - as recommended by vascular surgery    No further stroke evaluation is recommended, so we will sign off. Please contact us with any additional questions.    ANJANA Santos, CNP  Neurology  To page me or covering stroke neurology team member, click here: [facility]   Choose \"On Call\" tab at top, then search dropdown box for \"Neurology Adult\", select location, press Enter, then look for stroke/neuro ICU/telestroke.    ______________________________________________________    Clinically Significant " Risk Factors Present on Admission                 Medications   Scheduled Meds    acetaminophen  975 mg Oral Q8H     aspirin  81 mg Oral Daily    Or     aspirin  81 mg Oral or NG Tube Daily     atorvastatin  40 mg Oral or Feeding Tube QPM     clopidogrel  75 mg Oral Daily     polyethylene glycol  17 g Oral Daily     senna-docusate  1 tablet Oral BID     sodium chloride (PF)  3 mL Intracatheter Q8H       Infusion Meds    - MEDICATION INSTRUCTIONS -       - MEDICATION INSTRUCTIONS -       BETA BLOCKER NOT PRESCRIBED         PRN Meds  acetaminophen **OR** acetaminophen, sore throat lozenge, bisacodyl, hydrOXYzine, labetalol, lidocaine 4%, lidocaine (buffered or not buffered), magnesium hydroxide, - MEDICATION INSTRUCTIONS -, - MEDICATION INSTRUCTIONS -, melatonin, naloxone **OR** naloxone **OR** naloxone **OR** naloxone, ondansetron **OR** ondansetron, ondansetron **OR** ondansetron, oxyCODONE **OR** oxyCODONE, prochlorperazine **OR** prochlorperazine, prochlorperazine **OR** prochlorperazine **OR** prochlorperazine, BETA BLOCKER NOT PRESCRIBED, sodium chloride (PF)       PHYSICAL EXAMINATION  Temp:  [97.9  F (36.6  C)-98.2  F (36.8  C)] 98  F (36.7  C)  Pulse:  [] 54  Resp:  [8-23] 16  BP: (113-159)/(74-98) 126/86  MAP:  [105 mmHg-114 mmHg] 109 mmHg  Arterial Line BP: (141-156)/(79-86) 149/80  SpO2:  [90 %-99 %] 96 %      Neurologic  Mental Status:  alert, oriented x 3, follows commands, speech clear and fluent, naming and repetition normal  Cranial Nerves:  visual fields intact, PERRL, EOMI with normal smooth pursuit, facial sensation intact and symmetric, facial movements symmetric, hearing not formally tested but intact to conversation, no dysarthria, shoulder shrug strong bilaterally, tongue protrusion midline  Motor:  normal muscle tone and bulk, no abnormal movements, able to move all limbs spontaneously, no pronator drift  Reflexes:  toes down-going  Sensory:  light touch sensation intact and symmetric  throughout upper and lower extremities, no extinction on double simultaneous stimulation   Coordination:  no dysmetria with FTN  Station/Gait:  deferred    Stroke Scales    NIHSS  Interval transfer (to stn 73) (09/18/21 2054)   Interval Comments     1a. Level of Consciousness 0-->Alert, keenly responsive   1b. LOC Questions 0-->Answers both questions correctly   1c. LOC Commands 0-->Performs both tasks correctly   2.   Best Gaze 0-->Normal   3.   Visual 0-->No visual loss   4.   Facial Palsy 0-->Normal symmetrical movements   5a. Motor Arm, Left 0-->No drift, limb holds 90 (or 45) degrees for full 10 secs   5b. Motor Arm, Right 0-->No drift, limb holds 90 (or 45) degrees for full 10 secs   6a. Motor Leg, Left 0-->No drift, leg holds 30 degree position for full 5 secs   6b. Motor Leg, right 0-->No drift, leg holds 30 degree position for full 5 secs   7.   Limb Ataxia 0-->Absent   8.   Sensory 0-->Normal, no sensory loss   9.   Best Language 0-->No aphasia, normal   10. Dysarthria 0-->Normal   11. Extinction and Inattention  0-->No abnormality   Total 0 (09/21/21 1018)       Modified Michelle Score (Discharge)  0-No deficits    Imaging  I personally reviewed all imaging; relevant findings per HPI.     Lab Results Data   CBC  Recent Labs   Lab 09/21/21  0717 09/19/21  0829 09/18/21  1222   WBC  --  6.9 5.9   RBC  --  5.05 4.62   HGB 13.7 15.7 14.4   HCT 40.8 45.1 42.0   PLT  --  266 246     Basic Metabolic Panel    Recent Labs   Lab 09/21/21  0717 09/19/21  1701 09/19/21  1212 09/19/21  0829 09/19/21  0754 09/18/21  2234 09/18/21  1225 09/18/21  1222   0000     --   --  141  --   --   --  138  --    POTASSIUM 3.9  --   --  3.8  --   --   --  4.4  --    CHLORIDE 108  --   --  109  --   --   --  106  --    CO2 23  --   --  23  --   --   --  25  --    BUN 17  --   --  15  --   --   --  23*  --    CR 0.97  --   --  0.94  --   --  1.1 1.10  --    * 98 102* 117*   < >   < >  --  116   < >   CHRIS 8.4*  --   --  8.9   --   --   --  9.0  --     < > = values in this interval not displayed.     Liver Panel  No results for input(s): PROTTOTAL, ALBUMIN, BILITOTAL, ALKPHOS, AST, ALT, BILIDIRECT in the last 168 hours.  INR    Recent Labs   Lab Test 09/18/21  1222   INR 0.98      Lipid Profile    Recent Labs   Lab Test 09/19/21  0829   CHOL 231*   HDL 54   *   TRIG 188*     A1C    Recent Labs   Lab Test 09/19/21  0829   A1C 5.3     Troponin I  No results for input(s): TROPONIN in the last 168 hours.

## 2021-09-21 NOTE — PROGRESS NOTES
09/21/21 0855   General Information   Onset of Illness/Injury or Date of Surgery 09/18/21   Referring Physician Dr. Brown   Patient/Family Therapy Goal Statement (SLP) Patient did not state.    Pertinent History of Current Problem Patient is a 66-year-old male with no previous past medical history, who presented with garbled speech and word finding difficulty, found to have a left parietal cortical stroke and left internal carotid artery stenosis.   Past History of Dysphagia No documented history.    Type of Evaluation   Type of Evaluation Swallow Evaluation   Oral Motor   Oral Musculature generally intact   Structural Abnormalities none present   Mucosal Quality adequate   Dentition (Oral Motor)   Dentition (Oral Motor) natural dentition;some missing teeth   Facial Symmetry (Oral Motor)   Facial Symmetry (Oral Motor) WNL   Lip Function (Oral Motor)   Lip Range of Motion (Oral Motor) WNL   Tongue Function (Oral Motor)   Tongue ROM (Oral Motor) WNL   Jaw Function (Oral Motor)   Jaw Function (Oral Motor) WNL   Cough/Swallow/Gag Reflex (Oral Motor)   Soft Palate/Velum (Oral Motor) WNL   Vocal Quality/Secretion Management (Oral Motor)   Vocal Quality (Oral Motor) hoarse   Secretion Management (Oral Motor) WNL   General Swallowing Observations   Current Diet/Method of Nutritional Intake (General Swallowing Observations, NIS) regular diet;thin liquids (level 0)   Respiratory Support (General Swallowing Observations) none   Swallowing Evaluation Clinical swallow evaluation   Clinical Swallow Evaluation   Feeding Assistance no assistance needed   Clinical Swallow Evaluation Textures Trialed thin liquids;pureed;solid foods   Clinical Swallow Eval: Thin Liquid Texture Trial   Mode of Presentation, Thin Liquids cup;straw;self-fed   Volume of Liquid or Food Presented 4 oz of water   Oral Phase of Swallow WFL   Pharyngeal Phase of Swallow intact   Clinical Swallow Evaluation: Puree Solid Texture Trial   Mode of Presentation,  Puree spoon;self-fed   Volume of Puree Presented 3 teaspoons   Oral Phase, Puree WFL   Pharyngeal Phase, Puree intact   Clinical Swallow Evaluation: Solid Food Texture Trial   Mode of Presentation self-fed   Volume Presented 1 hayley cracker   Oral Phase WFL   Pharyngeal Phase intact   Swallowing Recommendations   Diet Consistency Recommendations regular diet;thin liquids (level 0)   Supervision Level for Intake patient independent   Mode of Delivery Recommendations bolus size, small;slow rate of intake   Postural Recommendations none   Swallowing Maneuver Recommendations alternate food and liquid intake   Monitoring/Assistance Required (Eating/Swallowing) monitor for cough or change in vocal quality with intake   Recommended Feeding/Eating Techniques (Swallow Eval) patient is independent, no specific recommendations   Medication Administration Recommendations, Swallowing (SLP) Whole as tolerated.    SLP Therapy Assessment/Plan   Criteria for Skilled Therapeutic Interventions Met (SLP Eval) no problems identified which require skilled intervention   SLP Diagnosis Functional swallow   Comment, Therapy Assessment/Plan (SLP) Patient presents with an intact swallow function. No further skilled intervention is indicated at this time.    Therapy Plan Review/Discharge Plan (SLP)   Therapy Plan Review (SLP) evaluation/treatment results reviewed;care plan/treatment goals reviewed;risks/benefits reviewed;current/potential barriers reviewed;participants voiced agreement with care plan;participants included;patient   SLP Discharge Planning    SLP Discharge Recommendation (DC Rec) home   SLP Rationale for DC Rec No ST needs defer to medical team.    SLP Brief overview of current status  Intact swallow and no speech/language deficits.     Total Evaluation Time   Total Evaluation Time (Minutes) 12

## 2021-09-24 ENCOUNTER — OFFICE VISIT (OUTPATIENT)
Dept: FAMILY MEDICINE | Facility: CLINIC | Age: 66
End: 2021-09-24
Payer: COMMERCIAL

## 2021-09-24 VITALS
WEIGHT: 190.2 LBS | DIASTOLIC BLOOD PRESSURE: 84 MMHG | SYSTOLIC BLOOD PRESSURE: 126 MMHG | BODY MASS INDEX: 25.09 KG/M2 | OXYGEN SATURATION: 98 % | HEART RATE: 51 BPM

## 2021-09-24 DIAGNOSIS — M17.12 PRIMARY OSTEOARTHRITIS OF LEFT KNEE: ICD-10-CM

## 2021-09-24 DIAGNOSIS — R06.83 SNORING: ICD-10-CM

## 2021-09-24 DIAGNOSIS — Z86.73 HISTORY OF ISCHEMIC STROKE: ICD-10-CM

## 2021-09-24 DIAGNOSIS — Z09 HOSPITAL DISCHARGE FOLLOW-UP: Primary | ICD-10-CM

## 2021-09-24 DIAGNOSIS — Z98.890 S/P CAROTID ENDARTERECTOMY: ICD-10-CM

## 2021-09-24 PROCEDURE — 99203 OFFICE O/P NEW LOW 30 MIN: CPT | Performed by: FAMILY MEDICINE

## 2021-09-24 NOTE — PROGRESS NOTES
"    Assessment & Plan     Hospital discharge follow-up    S/P carotid endarterectomy    History of ischemic stroke  Snoring  - SLEEP EVALUATION & MANAGEMENT REFERRAL - ADULT -    Primary osteoarthritis of left knee  - Orthopedic [facility]  Hospital follow-up was not done fully.  He does not have any side effects or any residual effects from the surgery.  Carotid endarterectomy on the left side of the neck surgical scar was normal..  Well opposed.  The ecchymosis is dissipating but reassured regarding that.  Ecchymosis also noted on the left groin region did not show any signs of infection and they will continue to watch on that for now.  He is snoring and also has had problems with knee arthritis on the left side and I did put in a referral for him to sleep physician hospitalist orthopedic doctors.  Questions were answered.  }     BMI:   Estimated body mass index is 25.09 kg/m  as calculated from the following:    Height as of 9/18/21: 1.854 m (6' 1\").    Weight as of this encounter: 86.3 kg (190 lb 3.2 oz).           No follow-ups on file.    Leland Alicea MD  [facility]   Bernabe is a 66 year old who presents for the following health issues  accompanied by his spouse:    HPI   He was noted to be having some garbled speech and was seen at the hospital.  Evaluation noted that he has acute ischemia.  Laboratory evaluation also noted left-sided carotid artery stenosis.  He did have endarterectomy and comes in today to have a follow-up as advised.  On today's visit he does not have any concerns regarding the admission and surgery.  He also noted no lightheadedness, no headaches, no weaknesses, and he does not sleep well.  But he is also concerned about having some snoring which according to the wife has been almost every day.  Wonders if there is any evaluation at this time.  Also having left-sided knee pain for which he was seen by his primary care physician " and had a shot of steroid.  He is still having a lot of pain and he is wondering what will be the next step.      Hospital Follow-up Visit:    Hospital/Nursing Home/IP Rehab Facility: [facility]  Date of Admission: 09/18/21  Date of Discharge: 09/21/21  Reason(s) for Admission: Acute ischemic stroke with no deficits at this time.  And high-grade left internal carotid artery stenosis status post endarterectomy done on 20 September.      Was your hospitalization related to COVID-19? No   Problems taking medications regularly:  None  Medication changes since discharge: Had clopidogrel added.  Also had atorvastatin started.  Problems adhering to non-medication therapy:  None    Summary of hospitalization:  [facility] discharge summary reviewed  Diagnostic Tests/Treatments reviewed.  Follow up needed: none  Other Healthcare Providers Involved in Patient s Care:         None  Update since discharge: improved.       Post Discharge Medication Reconciliation: discharge medications reconciled, continue medications without change.  Plan of care communicated with patient and Wife                Review of Systems   CONSTITUTIONAL: NEGATIVE for fever, chills, change in weight  ENT/MOUTH: POSITIVE for snoring  RESP: NEGATIVE for significant cough or SOB  CV: NEGATIVE for chest pain, palpitations or peripheral edema  GI: NEGATIVE for nausea, abdominal pain, heartburn, or change in bowel habits  MUSCULOSKELETAL:joint swelling and pain noted on the left knee.  NEURO: NEGATIVE for weakness, dizziness or paresthesias  PSYCHIATRIC: NEGATIVE for changes in mood or affect      Objective    /84 (BP Location: Left arm, Patient Position: Sitting, Cuff Size: Adult Large)   Pulse 51   Wt 86.3 kg (190 lb 3.2 oz)   SpO2 98%   BMI 25.09 kg/m    Body mass index is 25.09 kg/m .  Physical Exam   GENERAL: healthy, alert and no distress  EYES: Eyes grossly normal to inspection, PERRL and conjunctivae  and sclerae normal  HENT: ear canals and TM's normal, nose and mouth without ulcers or lesions  NECK: no adenopathy and does have left sided surgical scar that is well apposed, stable mostly covered by Steri-Strips, with mild surrounding ecchymosis.  Has no tenderness noted.  RESP: lungs clear to auscultation - no rales, rhonchi or wheezes  CV: regular rate and rhythm, normal S1 S2, no S3 or S4, no murmur, click or rub, no peripheral edema and peripheral pulses strong  ABDOMEN: soft, nontender, no hepatosplenomegaly, no masses and bowel sounds normal  MS: Little right-sided upper inner thigh/pelvic region ecchymosis with gauze covering on from the wound.  There is mild discomfort noted.  SKIN: no suspicious lesions or rashes  NEURO: Normal strength and tone both upper and lower extremities, mentation intact and speech normal.  Cranial nerve XII normal.  PSYCH: mentation appears normal, affect normal/bright

## 2021-10-05 NOTE — PROGRESS NOTES
[place] [facility]    Bernabe Grullon returns for follow-up via telephone.  Suffered a left hemispheric cerebral event documented by MRI with complete neurological recovery.  99% right carotid stenosis noted by CTA and a formal angiogram.  Minimal contralateral disease.  9/20/2021 left CEA with external jugular vein patch performed with no complications.      PMH: Medications: Lipitor, aspirin, Plavix   Medical: Hyperlipidemia with LDL= 139.  Initiated on statin.    Normal cardiac function with left ventricular EF= 60%.  Mild aortic regurgitation.    No diabetes with A1c= 5.3    No history of hypertension.    TELEPHONE CONSULT: I spoke with Bernabe on the phone today.  He lives in [place] [facility] [place] [facility] which is an hour [place] a half from here.  He is doing excellent.  His incision is healing well.  Some mild mandibular numbness is expected that we discussed.  He has no neurological issues at all and is completely back to baseline.    He is checking his blood pressure at home which is usually in the range of 140-150 but has been as low as 120.  No need to watch this closely since the new goal is 120/70 and this was discussed.    He had some knee issues that when he was being evaluated by his orthopedic surgeon.  If he does need surgery in the near future he could be done once his Plavix has been discontinued 1 month post procedure.  They should try to hold the aspirin for the minimal amount necessary.      IMPRESSION: Overall doing very well.  Aspirin indefinitely with Plavix  to be completed 1 month postoperatively.  Follow-up carotid duplex in our office in 3 months with clinical evaluation.    No restrictions to his activities.  Repeat lipid profile in 3 months to see if the Lipitor has been effective.    Spent 8 minutes on phone call today being completed at 1333 hrs.       Puma Victor MD

## 2021-10-07 ENCOUNTER — VIRTUAL VISIT (OUTPATIENT)
Dept: OTHER | Facility: CLINIC | Age: 66
End: 2021-10-07
Attending: SURGERY
Payer: COMMERCIAL

## 2021-10-07 DIAGNOSIS — E78.5 HYPERLIPIDEMIA LDL GOAL <70: ICD-10-CM

## 2021-10-07 DIAGNOSIS — I65.22 LEFT CAROTID ARTERY STENOSIS: ICD-10-CM

## 2021-10-07 DIAGNOSIS — I65.21 SYMPTOMATIC STENOSIS OF RIGHT CAROTID ARTERY: Primary | ICD-10-CM

## 2021-10-07 PROCEDURE — 99024 POSTOP FOLLOW-UP VISIT: CPT | Performed by: SURGERY

## 2021-10-07 NOTE — PROGRESS NOTES
Bernabe is a 66 year old who is being evaluated via a billable telephone visit.      What phone number would you like to be contacted at? [phone]  How would you like to obtain your AVS? Kenia Guzmán MA

## 2021-10-17 ENCOUNTER — HEALTH MAINTENANCE LETTER (OUTPATIENT)
Age: 66
End: 2021-10-17

## 2021-12-02 ENCOUNTER — TELEPHONE (OUTPATIENT)
Dept: OTHER | Facility: CLINIC | Age: 66
End: 2021-12-02
Payer: COMMERCIAL

## 2021-12-02 NOTE — TELEPHONE ENCOUNTER
Per pt mychart message:  Pt needs follow up asap due to upcoming knee surgery, [facility] providers requesting results of f/u vascular/imaging prior to knee sx.     Routing to  to coordinate soon US carotid Left and then in person OV with Dr. Victor. (okay to have this sooner than ordered).     Pt may be reached at: [phone]    Appt note: f/u to 10/7/21, hx of left CEA on 9/20/21. Pt having upcoming knee sx.     CHOCO VelazquezN, RN  [facility]  Office:  [phone] Fax: [phone]

## 2021-12-15 NOTE — PROGRESS NOTES
[place] [facility]ASC[facility]    Bernabe Grullon returns for vascular follow-up.  He had a left hemispheric stroke documented by MRI with complete neurological recovery.  But had a 99% left carotid stenosis by CTA and formal angiogram with minimal contralateral disease.  Left CEA with external jugular vein patch 9/20/2021 without complications.    He is doing very well and telephone follow-up on 10/7/2021 with no neurological issues.  Was having some issues with mild hypertension that he was watching.  Had been started on Lipitor for LDL= 139 admission.  Non-smoker.      PMH: Medications: Lipitor, aspirin   Medical: Hyperlipidemia    No history of CAD or PAD    Degenerative arthritis in need of left knee replacement    Never smoked.    Exam: Alert and appropriate.  Normal affect.  Here with his wife.   Blood pressure 160/85 (at home consistently 130s) pulse 67 regular   HEENT= unremarkable.  Well-healed left carotid incision.   Chest= clear   Cardiovascular= regular rate   Extremities= normal sensation.  No edema.    +3 palpable DP/PT pulses bilaterally      Carotid duplex today reveals a widely patent left CEA.  Normal velocities and ratio.        Impression: #1.  Widely patent left CEA.  Known minimal contralateral disease.  Continue on baby aspirin indefinitely.  Follow-up bilateral carotid duplex in 1 year.     #2.  No evidence of PAD with excellent pulses.  Healing should not be an issue with his left knee replacement surgery in February 2022.  He may hold his aspirin per orthopedic surgery.       #3.  Discussed the importance of good risk factor control.  LDL= 139 at the time of surgery.  He has been initiated on Lipitor and should have this checked in the near future to make sure this medication is working appropriately.    20 minutes with patient and wife today.    Puma Victor MD  This note was created using Dragon voice recognition software which may result in transcription errors.    CC  Patient  Care Team:  Tate Tafoya as PCP - General (Family Medicine)  Leland Alicea MD as Assigned PCP  Puma Victor as Assigned Heart and Vascular Provider

## 2021-12-16 ENCOUNTER — OFFICE VISIT (OUTPATIENT)
Dept: OTHER | Facility: CLINIC | Age: 66
End: 2021-12-16
Attending: SURGERY
Payer: COMMERCIAL

## 2021-12-16 ENCOUNTER — HOSPITAL ENCOUNTER (OUTPATIENT)
Dept: ULTRASOUND IMAGING | Facility: CLINIC | Age: 66
End: 2021-12-16
Attending: SURGERY
Payer: COMMERCIAL

## 2021-12-16 VITALS — HEART RATE: 67 BPM | SYSTOLIC BLOOD PRESSURE: 160 MMHG | RESPIRATION RATE: 16 BRPM | DIASTOLIC BLOOD PRESSURE: 85 MMHG

## 2021-12-16 DIAGNOSIS — Z98.890 HISTORY OF LEFT-SIDED CAROTID ENDARTERECTOMY: Primary | ICD-10-CM

## 2021-12-16 DIAGNOSIS — I65.22 LEFT CAROTID ARTERY STENOSIS: ICD-10-CM

## 2021-12-16 DIAGNOSIS — E78.5 HYPERLIPIDEMIA LDL GOAL <70: ICD-10-CM

## 2021-12-16 PROCEDURE — 93882 EXTRACRANIAL UNI/LTD STUDY: CPT | Mod: LT

## 2021-12-16 PROCEDURE — 99024 POSTOP FOLLOW-UP VISIT: CPT | Performed by: SURGERY

## 2021-12-16 PROCEDURE — G0463 HOSPITAL OUTPT CLINIC VISIT: HCPCS

## 2021-12-16 NOTE — PROGRESS NOTES
[facility]        Patient is here for a follow up  to discuss about US of the left carotid results    BP (!) 160/85 (BP Location: Right arm, Patient Position: Chair, Cuff Size: Adult Regular)   Pulse 67   Resp 16     The provider has been notified that the patient has no concerns.     Questions patient would like addressed today are: N/A.    Refills are needed: No    Has homecare services and agency name:  Gia [facility]       Patient called to report transportation did not pick him up this morning as he is not staying at his home today.  Patient reports he is staying at [facility] and will call transportation and come in tomorrow morning.

## 2022-05-29 ENCOUNTER — HEALTH MAINTENANCE LETTER (OUTPATIENT)
Age: 67
End: 2022-05-29

## 2022-10-03 ENCOUNTER — HEALTH MAINTENANCE LETTER (OUTPATIENT)
Age: 67
End: 2022-10-03

## 2022-11-17 ENCOUNTER — OFFICE VISIT (OUTPATIENT)
Dept: OTHER | Facility: CLINIC | Age: 67
End: 2022-11-17
Attending: SURGERY
Payer: COMMERCIAL

## 2022-11-17 ENCOUNTER — HOSPITAL ENCOUNTER (OUTPATIENT)
Dept: ULTRASOUND IMAGING | Facility: CLINIC | Age: 67
Discharge: HOME | End: 2022-11-17
Attending: SURGERY
Payer: COMMERCIAL

## 2022-11-17 VITALS — HEART RATE: 66 BPM | DIASTOLIC BLOOD PRESSURE: 76 MMHG | SYSTOLIC BLOOD PRESSURE: 122 MMHG

## 2022-11-17 DIAGNOSIS — Z98.890 HISTORY OF LEFT-SIDED CAROTID ENDARTERECTOMY: ICD-10-CM

## 2022-11-17 DIAGNOSIS — I65.22 LEFT CAROTID ARTERY STENOSIS: ICD-10-CM

## 2022-11-17 DIAGNOSIS — E78.5 HYPERLIPIDEMIA LDL GOAL <70: ICD-10-CM

## 2022-11-17 DIAGNOSIS — Z98.890 HISTORY OF LEFT-SIDED CAROTID ENDARTERECTOMY: Primary | ICD-10-CM

## 2022-11-17 PROCEDURE — 93880 EXTRACRANIAL BILAT STUDY: CPT

## 2022-11-17 PROCEDURE — 99213 OFFICE O/P EST LOW 20 MIN: CPT | Performed by: SURGERY

## 2022-11-17 PROCEDURE — G0463 HOSPITAL OUTPT CLINIC VISIT: HCPCS

## 2022-11-17 NOTE — PROGRESS NOTES
[facility]        Patient is here for a  follow up.          Pt is currently taking Statin.    /76 (BP Location: Left arm, Patient Position: Chair, Cuff Size: Adult Large)   Pulse 66     The provider has been notified that the patient has no concerns.     Questions patient would like addressed today are: N/A.    Refills are needed: N/A    Has homecare services and agency name:  Gia Guzmán MA

## 2022-11-17 NOTE — PROGRESS NOTES
[place] [facility]      Bernabe Grullon suffered a left hemispheric stroke with complete neurological recovery.  99% left carotid stenosis by CTA and formal angiogram with minimal contralateral disease.  9/20/2021 left CEA with external jugular vein patch.  No recurrent neurological issues.     Widely patent carotid duplex on 12/16/2021    PMH: Medications: Lipitor, aspirin, Losartan   Medical: Hyperlipidemia on statin--preop LDL= 139    Degenerative arthritis with left knee replacement-4/22    No CAD or PAD    Never smoked      Noted higher blood pressure.  Started on losartan    ROS: No recurrent neurological issues.  Very pleased with left knee replacement surgery.  Will eventually need right side time.  Remains very active.  No chest pain or shortness of breath with activity.    Exam: Alert and appropriate.  Normal affect.   Blood pressure 122/76.  Pulse 66 regular.   HEENT= well-healed carotid incision   Chest= clear   Cardiovascular= regular rate   Extremities= unremarkable.      Carotid duplex today reveals a widely patent left CEA with normal velocities and no visualized stenosis.  Slight wall thickening at the proximal right ICA with normal velocities.          Impression: #1.  Widely patent left CEA with minimal disease in the right.  Follow-up carotid duplex in 1 year.  Continue indefinitely on baby aspirin.       #2.  Aware the importance of risk factor control with his known carotid disease.  He has never smoked.  Now on a blood pressure medication with excellent control.  He has been following with his primary care physician's LDL but I cannot access these results.  Goal would be less than 70 in his situation and this was discussed.    20 minutes with patient today.    Follow-up in 1 year.    Puma Victor MD   This note was created using Dragon voice recognition software which may result in transcription errors.    CC: Dr. Tate Tafoya

## 2023-06-04 ENCOUNTER — HEALTH MAINTENANCE LETTER (OUTPATIENT)
Age: 68
End: 2023-06-04

## 2023-11-15 NOTE — PROGRESS NOTES
[place] [facility]    Bernabe Grullon returns for vascular follow-up.  He suffered a left hemispheric stroke with neurologic recovery.  9/20/2021 left CEA with external jugular vein patch for 99% stenosis with minimal contralateral disease.  CEA and right carotid widely patent on 11/17/2022 22 duplex.      PMH: Medications: Losartan, Lipitor, aspirin     Medical: Hypertension    Hyperlipidemia on statin 3/22/2022 LDL= 63    No known CAD or PAD    Never smoked.    COVID-positive 4/8/2022    ROS: Remains very active.  No claudication or cardiac symptoms.  Well-controlled hypertension at home.  Status post left knee replacement and doing well but having some hip issues presently.  No residual neurological issues.    Exam: Alert and appropriate.  Normal affect.  Talkative.  Here with his wife.   Blood pressure 129/79.  Pulse 56 regular.  HEENT= well-healed carotid incision with no bruits. Glasses.  Chest= clear to auscultation  Cardiovascular= regular rate with no murmur  Abdomen =soft, nontender  Extremities= no edema.  Normal sensation.   +3 PT pulses bilaterally    Carotid duplex today reveals a widely patent right CEA with no stenosis and normal velocities.  Very minimal carotid bulb and proximal ICA stenosis with normal velocities.         IMPRESSION:     #1.  Widely patent left CEA following symptomatic critical ulcerated stenosis.  Very minimal contralateral disease.  Plan to repeat carotid duplex in 2 years.  Would recommend due to his history of vascular issues of continue on baby aspirin.    #2.  Excellent risk factor control.  LDL at goal on statin and with vascular issues would recommend continues on statin.  Well-controlled blood pressure on medications.  Non-smoker.  Active lifestyle.    25 minutes with patient and wife today including chart review.  Follow-up exam in 2 years.    Puma Victor MD   This note was created using Dragon voice recognition software which may result in  transcription errors.

## 2023-11-16 ENCOUNTER — HOSPITAL ENCOUNTER (OUTPATIENT)
Dept: ULTRASOUND IMAGING | Facility: CLINIC | Age: 68
Discharge: HOME | End: 2023-11-16
Attending: SURGERY
Payer: COMMERCIAL

## 2023-11-16 ENCOUNTER — OFFICE VISIT (OUTPATIENT)
Dept: OTHER | Facility: CLINIC | Age: 68
End: 2023-11-16
Attending: SURGERY
Payer: COMMERCIAL

## 2023-11-16 VITALS — SYSTOLIC BLOOD PRESSURE: 129 MMHG | DIASTOLIC BLOOD PRESSURE: 79 MMHG | HEART RATE: 56 BPM

## 2023-11-16 DIAGNOSIS — Z98.890 HISTORY OF LEFT-SIDED CAROTID ENDARTERECTOMY: Primary | ICD-10-CM

## 2023-11-16 DIAGNOSIS — I65.22 SYMPTOMATIC STENOSIS OF LEFT CAROTID ARTERY: ICD-10-CM

## 2023-11-16 DIAGNOSIS — Z98.890 HISTORY OF LEFT-SIDED CAROTID ENDARTERECTOMY: ICD-10-CM

## 2023-11-16 DIAGNOSIS — E78.5 HYPERLIPIDEMIA LDL GOAL <70: ICD-10-CM

## 2023-11-16 DIAGNOSIS — I65.22 LEFT CAROTID ARTERY STENOSIS: ICD-10-CM

## 2023-11-16 PROCEDURE — 93880 EXTRACRANIAL BILAT STUDY: CPT

## 2023-11-16 PROCEDURE — G0463 HOSPITAL OUTPT CLINIC VISIT: HCPCS | Mod: 25 | Performed by: SURGERY

## 2023-11-16 PROCEDURE — 99214 OFFICE O/P EST MOD 30 MIN: CPT | Performed by: SURGERY

## 2023-11-16 NOTE — PROGRESS NOTES
[facility]        Patient is here for a  follow up.      Pt is currently taking Aspirin and Statin.    /79 (BP Location: Left arm, Patient Position: Chair, Cuff Size: Adult Regular)   Pulse 56     The provider has been notified that the patient has no concerns.     Questions patient would like addressed today are: N/A.    Refills are needed: N/A    Has homecare services and agency name:  Gia Guzmán MA

## 2024-07-27 ENCOUNTER — HEALTH MAINTENANCE LETTER (OUTPATIENT)
Age: 69
End: 2024-07-27

## 2024-10-28 ENCOUNTER — OFFICE VISIT (OUTPATIENT)
Dept: FAMILY MEDICINE | Facility: CLINIC | Age: 69
End: 2024-10-28
Payer: COMMERCIAL

## 2024-10-28 VITALS
WEIGHT: 196.3 LBS | SYSTOLIC BLOOD PRESSURE: 146 MMHG | HEIGHT: 73 IN | OXYGEN SATURATION: 99 % | RESPIRATION RATE: 16 BRPM | DIASTOLIC BLOOD PRESSURE: 84 MMHG | HEART RATE: 60 BPM | BODY MASS INDEX: 26.02 KG/M2 | TEMPERATURE: 97.9 F

## 2024-10-28 DIAGNOSIS — M25.561 CHRONIC PAIN OF RIGHT KNEE: ICD-10-CM

## 2024-10-28 DIAGNOSIS — R35.1 BENIGN PROSTATIC HYPERPLASIA WITH NOCTURIA: ICD-10-CM

## 2024-10-28 DIAGNOSIS — Z01.818 PREOPERATIVE EXAMINATION: Primary | ICD-10-CM

## 2024-10-28 DIAGNOSIS — R20.0 NUMBNESS OF RIGHT THUMB: ICD-10-CM

## 2024-10-28 DIAGNOSIS — G45.9 TIA (TRANSIENT ISCHEMIC ATTACK): ICD-10-CM

## 2024-10-28 DIAGNOSIS — G89.29 CHRONIC PAIN OF RIGHT KNEE: ICD-10-CM

## 2024-10-28 DIAGNOSIS — I44.0 FIRST DEGREE HEART BLOCK BY ELECTROCARDIOGRAM: ICD-10-CM

## 2024-10-28 DIAGNOSIS — R06.83 SNORING: ICD-10-CM

## 2024-10-28 DIAGNOSIS — N40.1 BENIGN PROSTATIC HYPERPLASIA WITH NOCTURIA: ICD-10-CM

## 2024-10-28 DIAGNOSIS — Z98.890 H/O CAROTID ENDARTERECTOMY: ICD-10-CM

## 2024-10-28 DIAGNOSIS — E78.5 HYPERLIPIDEMIA LDL GOAL <70: ICD-10-CM

## 2024-10-28 DIAGNOSIS — I10 ESSENTIAL HYPERTENSION: ICD-10-CM

## 2024-10-28 DIAGNOSIS — Z23 IMMUNIZATION DUE: ICD-10-CM

## 2024-10-28 PROBLEM — I63.9 ACUTE ISCHEMIC STROKE (H): Status: RESOLVED | Noted: 2021-09-18 | Resolved: 2024-10-28

## 2024-10-28 LAB
ATRIAL RATE - MUSE: 55 BPM
DIASTOLIC BLOOD PRESSURE - MUSE: NORMAL MMHG
INTERPRETATION ECG - MUSE: NORMAL
P AXIS - MUSE: 47 DEGREES
PR INTERVAL - MUSE: 220 MS
QRS DURATION - MUSE: 98 MS
QT - MUSE: 396 MS
QTC - MUSE: 378 MS
R AXIS - MUSE: 4 DEGREES
SYSTOLIC BLOOD PRESSURE - MUSE: NORMAL MMHG
T AXIS - MUSE: 31 DEGREES
VENTRICULAR RATE- MUSE: 55 BPM

## 2024-10-28 PROCEDURE — 93010 ELECTROCARDIOGRAM REPORT: CPT | Performed by: STUDENT IN AN ORGANIZED HEALTH CARE EDUCATION/TRAINING PROGRAM

## 2024-10-28 PROCEDURE — 91320 SARSCV2 VAC 30MCG TRS-SUC IM: CPT | Performed by: FAMILY MEDICINE

## 2024-10-28 PROCEDURE — 90677 PCV20 VACCINE IM: CPT | Performed by: FAMILY MEDICINE

## 2024-10-28 PROCEDURE — 93005 ELECTROCARDIOGRAM TRACING: CPT | Performed by: FAMILY MEDICINE

## 2024-10-28 PROCEDURE — G0008 ADMIN INFLUENZA VIRUS VAC: HCPCS | Performed by: FAMILY MEDICINE

## 2024-10-28 PROCEDURE — 90480 ADMN SARSCOV2 VAC 1/ONLY CMP: CPT | Performed by: FAMILY MEDICINE

## 2024-10-28 PROCEDURE — 99204 OFFICE O/P NEW MOD 45 MIN: CPT | Mod: 25 | Performed by: FAMILY MEDICINE

## 2024-10-28 PROCEDURE — 90662 IIV NO PRSV INCREASED AG IM: CPT | Performed by: FAMILY MEDICINE

## 2024-10-28 PROCEDURE — G0009 ADMIN PNEUMOCOCCAL VACCINE: HCPCS | Performed by: FAMILY MEDICINE

## 2024-10-28 RX ORDER — ATORVASTATIN CALCIUM 80 MG/1
80 TABLET, FILM COATED ORAL EVERY EVENING
Qty: 90 TABLET | Refills: 3 | Status: SHIPPED | OUTPATIENT
Start: 2024-10-28

## 2024-10-28 RX ORDER — TAMSULOSIN HYDROCHLORIDE 0.4 MG/1
0.4 CAPSULE ORAL DAILY
Qty: 90 CAPSULE | Refills: 3 | Status: SHIPPED | OUTPATIENT
Start: 2024-10-28

## 2024-10-28 RX ORDER — LOSARTAN POTASSIUM 50 MG/1
50 TABLET ORAL DAILY
Qty: 90 TABLET | Refills: 3 | Status: SHIPPED | OUTPATIENT
Start: 2024-10-28

## 2024-10-28 ASSESSMENT — PAIN SCALES - GENERAL: PAINLEVEL_OUTOF10: MILD PAIN (2)

## 2024-10-28 NOTE — Clinical Note
Please abstract the following data from this visit with this patient into the appropriate field in Epic:  Tests that can be patient reported without a hard copy:  Colonoscopy done on this date: 2021 (approximately), by this group: GI in [place], results were normal, 10 year follow up.   Other Tests found in the patient's chart through Chart Review/Care Everywhere:  {Abstract Quality List (Optional):107850}  Note to Abstraction: If this section is blank, no results were found via Chart Review/Care Everywhere.

## 2024-10-28 NOTE — PROGRESS NOTES
Preoperative Evaluation  [facility]  [address] [facility] [address]  Phone: [phone]  Fax: [phone]  Primary Provider: KIERA CASTELAN  Pre-op Performing Provider: Gibson Bello MD  Oct 28, 2024             10/23/2024   Surgical Information   What procedure is being done? Right knee replacement    Facility or Hospital where procedure/surgery will be performed: [facility]    Who is doing the procedure / surgery? Rolanda    Date of surgery / procedure: 11/13/2024    Time of surgery / procedure: Unknown    Where do you plan to recover after surgery? at home with family        Patient-reported     Fax number for surgical facility: [phone]    Assessment & Plan     The proposed surgical procedure is considered INTERMEDIATE risk.    Preoperative examination  Patient is low risk for cardiovascular complications and may proceed with right total knee arthroplasty without need for further testing.  - EKG 12-lead, tracing only    Chronic pain of right knee  Agree with right total knee arthroplasty.    Hyperlipidemia LDL goal <70  Controlled.  Continue atorvastatin.  - atorvastatin (LIPITOR) 80 MG tablet; Take 1 tablet (80 mg) by mouth every evening.    Essential hypertension  Slightly elevated.  Will increase losartan from 25 mg daily to 50 mg daily.  - losartan (COZAAR) 50 MG tablet; Take 1 tablet (50 mg) by mouth daily.    H/O carotid endarterectomy  Doing well with essentially normal carotid ultrasound last year, will need repeat next year (every 2 years).    TIA (transient ischemic attack)  No recurrence after carotid endarterectomy.    Snoring  Referral to sleep specialist per patient's request.  - Adult Sleep Eval & Management  Referral; Future    Numbness of right thumb  Most likely having some carpal tunnel.  Advised wrist brace.  If no improvement consider orthopedic hand follow-up.    Benign prostatic hyperplasia with nocturia  Patient  having nocturia.  Prescription for tamsulosin, discussed potential side effects.  - tamsulosin (FLOMAX) 0.4 MG capsule; Take 1 capsule (0.4 mg) by mouth daily. 30 minutes after a meal    First degree heart block by electrocardiogram  Stable.    Immunization due  - Pneumococcal 20 Valent Conjugate (PCV20)  - INFLUENZA HIGH DOSE, TRIVALENT, PF (FLUZONE)  - COVID-19 12+ (PFIZER)      The longitudinal plan of care for the diagnosis(es)/condition(s) as documented were addressed during this visit. Due to the added complexity in care, I will continue to support Evans in the subsequent management and with ongoing continuity of care.      Possible Sleep Apnea: Referral to sleep          Risks and Recommendations  The patient has the following additional risks and recommendations for perioperative complications:   - No identified additional risk factors other than previously addressed    Antiplatelet or Anticoagulation Medication Instructions   - aspirin: Discontinue aspirin 7-10 days prior to procedure to reduce bleeding risk. It should be resumed postoperatively.     Additional Medication Instructions   - ACE/ARB: DO NOT TAKE on day of surgery (minimum 11 hours for general anesthesia).   - Statins: Continue taking on the day of surgery.     Recommendation  Approval given to proceed with proposed procedure, without further diagnostic evaluation.    Shani Krueger is a 69 year old, presenting for the following:  Establish Care (Patient would like to establish care. Not fasting. ) and Pre-Op Exam (Patient is here for a pre op for a right knee replacement on 11/13/24 at [facility] in [place]. [phone])          10/28/2024     8:58 AM   Additional Questions   Roomed by giorgio gomez cma   Accompanied by self     Via the Health Maintenance questionnaire, the patient has reported the following services have been completed -Colonscopy: [employer] [place] 2020-11-20, this information has not been sent to the  abstraction team.  HPI related to upcoming procedure: Right knee pain        10/23/2024   Pre-Op Questionnaire   Have you ever had a heart attack or stroke? (!) YES - TIA in 2022    Have you ever had surgery on your heart or blood vessels, such as a stent placement, a coronary artery bypass, or surgery on an artery in your head, neck, heart, or legs? (!) YES - Left carotid endarterectomy in 2022    Do you have chest pain with activity? No    Do you have a history of heart failure? No    Do you currently have a cold, bronchitis or symptoms of other infection? No    Do you have a cough, shortness of breath, or wheezing? No    Do you or anyone in your family have previous history of blood clots? No    Do you or does anyone in your family have a serious bleeding problem such as prolonged bleeding following surgeries or cuts? No    Have you ever had problems with anemia or been told to take iron pills? No    Have you had any abnormal blood loss such as black, tarry or bloody stools? No    Have you ever had a blood transfusion? No    Are you willing to have a blood transfusion if it is medically needed before, during, or after your surgery? Yes    Have you or any of your relatives ever had problems with anesthesia? No    Do you have sleep apnea, excessive snoring or daytime drowsiness? (!) YES    Do you have a CPAP machine? (!) NO - not yet tested or diagnosed    Do you have any artifical heart valves or other implanted medical devices like a pacemaker, defibrillator, or continuous glucose monitor? No    Do you have artificial joints? (!) YES    Are you allergic to latex? No        Patient-reported     Health Care Directive  Patient does not have a Health Care Directive: Patient states has Advance Directive and will bring in a copy to clinic.    Preoperative Review of    reviewed - no record of controlled substances prescribed.          Patient Active Problem List    Diagnosis Date Noted    Acute ischemic stroke (H)  "09/18/2021     Priority: Medium      Past Medical History:   Diagnosis Date    Carotid stenosis      Past Surgical History:   Procedure Laterality Date    ENDARTERECTOMY CAROTID Left 9/20/2021    Procedure: CAROTID ENDARTERECTOMY WITH EXTERNAL JUGULAR VEIN PATCH;  Surgeon: Puma Victor;  Location: [facility] OR    IR CAROTID CERVICAL ANGIOGRAM LEFT  9/19/2021     Current Outpatient Medications   Medication Sig Dispense Refill    aspirin (ASA) 81 MG EC tablet Take 81 mg by mouth daily       atorvastatin (LIPITOR) 80 MG tablet 1 tablet (80 mg) by Oral or Feeding Tube route every evening 30 tablet 4    LOSARTAN POTASSIUM PO Take 25 mg by mouth         Allergies   Allergen Reactions    Penicillin G Other (See Comments)        Social History     Tobacco Use    Smoking status: Never    Smokeless tobacco: Never   Substance Use Topics    Alcohol use: Yes     Comment: 2 drinks daiy        History   Drug Use Unknown             Review of Systems  CONSTITUTIONAL: NEGATIVE for fever, chills, change in weight  INTEGUMENTARY/SKIN: NEGATIVE for worrisome rashes, moles or lesions  EYES: NEGATIVE for vision changes or irritation  ENT/MOUTH: NEGATIVE for ear, mouth and throat problems  RESP: NEGATIVE for significant cough or SOB  BREAST: NEGATIVE for masses, tenderness or discharge  CV: NEGATIVE for chest pain, palpitations or peripheral edema  GI: NEGATIVE for nausea, abdominal pain, heartburn, or change in bowel habits   male: nocturia x 3  MUSCULOSKELETAL:POSITIVE for right knee pain.  NEURO: POSITIVE for right thumb numbness, bilateral feet numbness (right > left).  ENDOCRINE: NEGATIVE for temperature intolerance, skin/hair changes  HEME: NEGATIVE for bleeding problems  PSYCHIATRIC: NEGATIVE for changes in mood or affect    Objective    BP (!) 146/84 (BP Location: Left arm, Patient Position: Sitting, Cuff Size: Adult Large)   Pulse 60   Temp 97.9  F (36.6  C) (Oral)   Resp 16   Ht 1.862 m (6' 1.31\")   Wt 89 kg (196 lb " "4.8 oz)   SpO2 99%   BMI 25.68 kg/m     Estimated body mass index is 25.68 kg/m  as calculated from the following:    Height as of this encounter: 1.862 m (6' 1.31\").    Weight as of this encounter: 89 kg (196 lb 4.8 oz).  Physical Exam  GENERAL: alert and no distress  EYES: Eyes grossly normal to inspection, PERRL and conjunctivae and sclerae normal  HENT: ear canals and TM's normal, nose and mouth without ulcers or lesions  NECK: no adenopathy, no asymmetry, masses, or scars  RESP: lungs clear to auscultation - no rales, rhonchi or wheezes  CV: regular rate and rhythm, normal S1 S2, no S3 or S4, no murmur, click or rub, no peripheral edema  ABDOMEN: soft, nontender, no hepatosplenomegaly, no masses and bowel sounds normal  MS: no gross musculoskeletal defects noted, no edema  SKIN: no suspicious lesions or rashes  NEURO: Normal strength and tone, mentation intact and speech normal  PSYCH: mentation appears normal, affect normal/bright        Diagnostics  No labs were ordered during this visit.   EKG: appears normal, NSR, first-degree AV block, no LVH by voltage criteria    Revised Cardiac Risk Index (RCRI)  The patient has the following serious cardiovascular risks for perioperative complications:   - Cerebrovascular Disease (TIA or CVA) = 1 point     RCRI Interpretation: 1 point: Class II (low risk - 0.9% complication rate)         Signed Electronically by: Gibson Bello MD  A copy of this evaluation report is provided to the requesting physician.         "

## 2024-10-30 ENCOUNTER — LAB REQUISITION (OUTPATIENT)
Dept: LAB | Facility: CLINIC | Age: 69
End: 2024-10-30
Payer: COMMERCIAL

## 2024-10-30 DIAGNOSIS — Z47.1 AFTERCARE FOLLOWING JOINT REPLACEMENT SURGERY: ICD-10-CM

## 2024-10-30 DIAGNOSIS — M17.11 UNILATERAL PRIMARY OSTEOARTHRITIS, RIGHT KNEE: ICD-10-CM

## 2024-10-30 LAB
ANION GAP SERPL CALCULATED.3IONS-SCNC: 11 MMOL/L (ref 7–15)
BUN SERPL-MCNC: 19.3 MG/DL (ref 8–23)
CALCIUM SERPL-MCNC: 9.7 MG/DL (ref 8.8–10.4)
CHLORIDE SERPL-SCNC: 106 MMOL/L (ref 98–107)
CREAT SERPL-MCNC: 0.99 MG/DL (ref 0.67–1.17)
EGFRCR SERPLBLD CKD-EPI 2021: 82 ML/MIN/1.73M2
ERYTHROCYTE [DISTWIDTH] IN BLOOD BY AUTOMATED COUNT: 11.9 % (ref 10–15)
GLUCOSE SERPL-MCNC: 111 MG/DL (ref 70–99)
HCO3 SERPL-SCNC: 25 MMOL/L (ref 22–29)
HCT VFR BLD AUTO: 42 % (ref 40–53)
HGB BLD-MCNC: 14.2 G/DL (ref 13.3–17.7)
MCH RBC QN AUTO: 31.3 PG (ref 26.5–33)
MCHC RBC AUTO-ENTMCNC: 33.8 G/DL (ref 31.5–36.5)
MCV RBC AUTO: 93 FL (ref 78–100)
PLATELET # BLD AUTO: 213 10E3/UL (ref 150–450)
POTASSIUM SERPL-SCNC: 4.5 MMOL/L (ref 3.4–5.3)
RBC # BLD AUTO: 4.54 10E6/UL (ref 4.4–5.9)
SODIUM SERPL-SCNC: 142 MMOL/L (ref 135–145)
WBC # BLD AUTO: 5.4 10E3/UL (ref 4–11)

## 2024-10-30 PROCEDURE — 85027 COMPLETE CBC AUTOMATED: CPT | Mod: ORL | Performed by: NURSE PRACTITIONER

## 2024-10-30 PROCEDURE — 36415 COLL VENOUS BLD VENIPUNCTURE: CPT | Mod: ORL | Performed by: NURSE PRACTITIONER

## 2024-10-30 PROCEDURE — 80048 BASIC METABOLIC PNL TOTAL CA: CPT | Mod: ORL | Performed by: NURSE PRACTITIONER

## 2024-12-16 ENCOUNTER — TELEPHONE (OUTPATIENT)
Dept: FAMILY MEDICINE | Facility: CLINIC | Age: 69
End: 2024-12-16
Payer: COMMERCIAL

## 2024-12-16 NOTE — TELEPHONE ENCOUNTER
Patient called back about below issue, relayed RN message- patient said he will try that if it is advised, he was trying to avoid going to the store and do it online.

## 2024-12-16 NOTE — TELEPHONE ENCOUNTER
Patient calling he is on vacation and forgot his medications at home.     RN recommended he go to a [facility] where he is locally, and request an emergency refill d/t forgetting his medication at home. Patient has active medications on file with refills and access to [facility] where he is.    ANTOINE Hussein  [facility]  [phone]    [facility]   Monday  - Thursday 7 AM - 6 PM    Friday  7 AM - 5 PM     -Please call your clinic for assistance from a nurse after hours.

## (undated) DEVICE — SU SILK 4-0 TIE 12X30" A303H

## (undated) DEVICE — SU SILK 2-0 TIE 24" SA75H

## (undated) DEVICE — DECANTER VIAL 2006S

## (undated) DEVICE — SURGICEL HEMOSTAT 2X3" 1953

## (undated) DEVICE — SHUNT SUNDT 3X4X10CM NL850-5060

## (undated) DEVICE — SU VICRYL 3-0 SH 27" J316H

## (undated) DEVICE — SOL WATER IRRIG 1000ML BOTTLE 2F7114

## (undated) DEVICE — NDL ANGIOCATH 20GA 1.25" 4056

## (undated) DEVICE — PREP CHLORAPREP W/ORANGE TINT 10.5ML 930715

## (undated) DEVICE — NDL 19GA 1.5"

## (undated) DEVICE — PACK VASCULAR SCV15VAFSB

## (undated) DEVICE — Device

## (undated) DEVICE — SU MONOCRYL 4-0 PS-2 18" UND Y496G

## (undated) DEVICE — ESU GROUND PAD UNIVERSAL W/O CORD

## (undated) DEVICE — SYR 10ML FINGER CONTROL W/O NDL 309695

## (undated) DEVICE — SU PROLENE 6-0 C-1DA 30" 8706H

## (undated) DEVICE — SUCTION CANISTER MEDIVAC LINER 3000ML W/LID 65651-530

## (undated) DEVICE — SYR 03ML LL W/O NDL 309657

## (undated) DEVICE — LINEN TOWEL PACK X5 5464

## (undated) DEVICE — IOM SUPPLIES

## (undated) DEVICE — DECANTER BAG 2002S

## (undated) DEVICE — NDL 22GA 1.5"

## (undated) DEVICE — SU PROLENE 7-0 BV-1DA 4X30" M8703

## (undated) DEVICE — BLADE KNIFE BEAVER MINI BEAVER6400

## (undated) DEVICE — SU SILK 3-0 TIE 24" SA74H

## (undated) DEVICE — GLOVE PROTEXIS W/NEU-THERA 7.5  2D73TE75

## (undated) RX ORDER — HEPARIN SODIUM 1000 [USP'U]/ML
INJECTION, SOLUTION INTRAVENOUS; SUBCUTANEOUS
Status: DISPENSED
Start: 2021-09-20

## (undated) RX ORDER — LABETALOL HYDROCHLORIDE 5 MG/ML
INJECTION, SOLUTION INTRAVENOUS
Status: DISPENSED
Start: 2021-09-20

## (undated) RX ORDER — NEOSTIGMINE METHYLSULFATE 1 MG/ML
VIAL (ML) INJECTION
Status: DISPENSED
Start: 2021-09-20

## (undated) RX ORDER — LIDOCAINE HYDROCHLORIDE 20 MG/ML
INJECTION, SOLUTION EPIDURAL; INFILTRATION; INTRACAUDAL; PERINEURAL
Status: DISPENSED
Start: 2021-09-20

## (undated) RX ORDER — FENTANYL CITRATE 50 UG/ML
INJECTION, SOLUTION INTRAMUSCULAR; INTRAVENOUS
Status: DISPENSED
Start: 2021-09-20

## (undated) RX ORDER — LIDOCAINE HYDROCHLORIDE 10 MG/ML
INJECTION, SOLUTION EPIDURAL; INFILTRATION; INTRACAUDAL; PERINEURAL
Status: DISPENSED
Start: 2021-09-20

## (undated) RX ORDER — LIDOCAINE HYDROCHLORIDE 10 MG/ML
INJECTION, SOLUTION INFILTRATION; PERINEURAL
Status: DISPENSED
Start: 2021-09-19

## (undated) RX ORDER — HEPARIN SODIUM 200 [USP'U]/100ML
INJECTION, SOLUTION INTRAVENOUS
Status: DISPENSED
Start: 2021-09-19

## (undated) RX ORDER — ESMOLOL HYDROCHLORIDE 10 MG/ML
INJECTION INTRAVENOUS
Status: DISPENSED
Start: 2021-09-20

## (undated) RX ORDER — REMIFENTANIL HYDROCHLORIDE 1 MG/ML
INJECTION, POWDER, LYOPHILIZED, FOR SOLUTION INTRAVENOUS
Status: DISPENSED
Start: 2021-09-20

## (undated) RX ORDER — ONDANSETRON 2 MG/ML
INJECTION INTRAMUSCULAR; INTRAVENOUS
Status: DISPENSED
Start: 2021-09-20

## (undated) RX ORDER — KETOROLAC TROMETHAMINE 30 MG/ML
INJECTION, SOLUTION INTRAMUSCULAR; INTRAVENOUS
Status: DISPENSED
Start: 2021-09-20

## (undated) RX ORDER — CLINDAMYCIN PHOSPHATE 900 MG/50ML
INJECTION, SOLUTION INTRAVENOUS
Status: DISPENSED
Start: 2021-09-20

## (undated) RX ORDER — PROPOFOL 10 MG/ML
INJECTION, EMULSION INTRAVENOUS
Status: DISPENSED
Start: 2021-09-20

## (undated) RX ORDER — FENTANYL CITRATE 50 UG/ML
INJECTION, SOLUTION INTRAMUSCULAR; INTRAVENOUS
Status: DISPENSED
Start: 2021-09-19

## (undated) RX ORDER — BUPIVACAINE HYDROCHLORIDE 5 MG/ML
INJECTION, SOLUTION EPIDURAL; INTRACAUDAL
Status: DISPENSED
Start: 2021-09-20